# Patient Record
Sex: FEMALE | Race: BLACK OR AFRICAN AMERICAN | NOT HISPANIC OR LATINO | Employment: OTHER
[De-identification: names, ages, dates, MRNs, and addresses within clinical notes are randomized per-mention and may not be internally consistent; named-entity substitution may affect disease eponyms.]

---

## 2017-07-29 ENCOUNTER — HEALTH MAINTENANCE LETTER (OUTPATIENT)
Age: 32
End: 2017-07-29

## 2018-08-05 ENCOUNTER — HEALTH MAINTENANCE LETTER (OUTPATIENT)
Age: 33
End: 2018-08-05

## 2019-11-05 ENCOUNTER — HEALTH MAINTENANCE LETTER (OUTPATIENT)
Age: 34
End: 2019-11-05

## 2020-11-22 ENCOUNTER — HEALTH MAINTENANCE LETTER (OUTPATIENT)
Age: 35
End: 2020-11-22

## 2021-09-19 ENCOUNTER — HEALTH MAINTENANCE LETTER (OUTPATIENT)
Age: 36
End: 2021-09-19

## 2021-11-12 LAB
ABO (EXTERNAL): NORMAL
HEPATITIS B SURFACE ANTIGEN (EXTERNAL): NEGATIVE
HIV1+2 AB SERPL QL IA: NEGATIVE
RH (EXTERNAL): POSITIVE
RUBELLA ANTIBODY IGG (EXTERNAL): NORMAL
VDRL (SYPHILIS) (EXTERNAL): NEGATIVE

## 2022-01-09 ENCOUNTER — HEALTH MAINTENANCE LETTER (OUTPATIENT)
Age: 37
End: 2022-01-09

## 2022-03-19 ENCOUNTER — APPOINTMENT (OUTPATIENT)
Dept: CT IMAGING | Facility: CLINIC | Age: 37
End: 2022-03-19
Attending: EMERGENCY MEDICINE
Payer: COMMERCIAL

## 2022-03-19 ENCOUNTER — HOSPITAL ENCOUNTER (EMERGENCY)
Facility: CLINIC | Age: 37
Discharge: HOME | End: 2022-03-19
Attending: EMERGENCY MEDICINE | Admitting: EMERGENCY MEDICINE
Payer: COMMERCIAL

## 2022-03-19 ENCOUNTER — ANCILLARY PROCEDURE (OUTPATIENT)
Dept: ULTRASOUND IMAGING | Facility: CLINIC | Age: 37
End: 2022-03-19
Attending: EMERGENCY MEDICINE
Payer: COMMERCIAL

## 2022-03-19 VITALS
SYSTOLIC BLOOD PRESSURE: 111 MMHG | WEIGHT: 197 LBS | RESPIRATION RATE: 16 BRPM | OXYGEN SATURATION: 100 % | HEART RATE: 84 BPM | BODY MASS INDEX: 30.92 KG/M2 | DIASTOLIC BLOOD PRESSURE: 74 MMHG | TEMPERATURE: 98.1 F | HEIGHT: 67 IN

## 2022-03-19 DIAGNOSIS — D64.9 ANEMIA, UNSPECIFIED TYPE: ICD-10-CM

## 2022-03-19 DIAGNOSIS — R55 SYNCOPE, UNSPECIFIED SYNCOPE TYPE: ICD-10-CM

## 2022-03-19 DIAGNOSIS — E83.42 HYPOMAGNESEMIA: ICD-10-CM

## 2022-03-19 LAB
ANION GAP SERPL CALCULATED.3IONS-SCNC: 9 MMOL/L (ref 5–18)
BUN SERPL-MCNC: 8 MG/DL (ref 8–22)
CALCIUM SERPL-MCNC: 8.4 MG/DL (ref 8.5–10.5)
CHLORIDE BLD-SCNC: 107 MMOL/L (ref 98–107)
CO2 SERPL-SCNC: 21 MMOL/L (ref 22–31)
CREAT SERPL-MCNC: 0.64 MG/DL (ref 0.6–1.1)
D DIMER PPP FEU-MCNC: 0.69 UG/ML FEU (ref 0–0.5)
ERYTHROCYTE [DISTWIDTH] IN BLOOD BY AUTOMATED COUNT: 13.9 % (ref 10–15)
GFR SERPL CREATININE-BSD FRML MDRD: >90 ML/MIN/1.73M2
GLUCOSE BLD-MCNC: 95 MG/DL (ref 70–125)
HCT VFR BLD AUTO: 28.9 % (ref 35–47)
HGB BLD-MCNC: 9.4 G/DL (ref 11.7–15.7)
MAGNESIUM SERPL-MCNC: 1.7 MG/DL (ref 1.8–2.6)
MCH RBC QN AUTO: 27.6 PG (ref 26.5–33)
MCHC RBC AUTO-ENTMCNC: 32.5 G/DL (ref 31.5–36.5)
MCV RBC AUTO: 85 FL (ref 78–100)
PLATELET # BLD AUTO: 280 10E3/UL (ref 150–450)
POTASSIUM BLD-SCNC: 3.6 MMOL/L (ref 3.5–5)
RBC # BLD AUTO: 3.41 10E6/UL (ref 3.8–5.2)
SODIUM SERPL-SCNC: 137 MMOL/L (ref 136–145)
WBC # BLD AUTO: 8.4 10E3/UL (ref 4–11)

## 2022-03-19 PROCEDURE — 93005 ELECTROCARDIOGRAM TRACING: CPT | Performed by: EMERGENCY MEDICINE

## 2022-03-19 PROCEDURE — 80048 BASIC METABOLIC PNL TOTAL CA: CPT | Performed by: EMERGENCY MEDICINE

## 2022-03-19 PROCEDURE — 36415 COLL VENOUS BLD VENIPUNCTURE: CPT | Performed by: EMERGENCY MEDICINE

## 2022-03-19 PROCEDURE — 99285 EMERGENCY DEPT VISIT HI MDM: CPT | Mod: 25

## 2022-03-19 PROCEDURE — 85379 FIBRIN DEGRADATION QUANT: CPT | Performed by: EMERGENCY MEDICINE

## 2022-03-19 PROCEDURE — 250N000011 HC RX IP 250 OP 636: Performed by: EMERGENCY MEDICINE

## 2022-03-19 PROCEDURE — 250N000013 HC RX MED GY IP 250 OP 250 PS 637: Performed by: EMERGENCY MEDICINE

## 2022-03-19 PROCEDURE — 85027 COMPLETE CBC AUTOMATED: CPT | Performed by: EMERGENCY MEDICINE

## 2022-03-19 PROCEDURE — 76815 OB US LIMITED FETUS(S): CPT

## 2022-03-19 PROCEDURE — 83735 ASSAY OF MAGNESIUM: CPT | Performed by: EMERGENCY MEDICINE

## 2022-03-19 PROCEDURE — 71275 CT ANGIOGRAPHY CHEST: CPT

## 2022-03-19 RX ORDER — MAGNESIUM OXIDE 400 MG/1
400 TABLET ORAL ONCE
Status: COMPLETED | OUTPATIENT
Start: 2022-03-19 | End: 2022-03-19

## 2022-03-19 RX ORDER — IOPAMIDOL 755 MG/ML
75 INJECTION, SOLUTION INTRAVASCULAR ONCE
Status: COMPLETED | OUTPATIENT
Start: 2022-03-19 | End: 2022-03-19

## 2022-03-19 RX ADMIN — MAGNESIUM OXIDE TAB 400 MG (241.3 MG ELEMENTAL MG) 400 MG: 400 (241.3 MG) TAB at 22:47

## 2022-03-19 RX ADMIN — IOPAMIDOL 75 ML: 755 INJECTION, SOLUTION INTRAVENOUS at 22:12

## 2022-03-20 LAB
ATRIAL RATE - MUSE: 84 BPM
DIASTOLIC BLOOD PRESSURE - MUSE: 80 MMHG
INTERPRETATION ECG - MUSE: NORMAL
P AXIS - MUSE: 59 DEGREES
PR INTERVAL - MUSE: 144 MS
QRS DURATION - MUSE: 80 MS
QT - MUSE: 380 MS
QTC - MUSE: 449 MS
R AXIS - MUSE: 27 DEGREES
SYSTOLIC BLOOD PRESSURE - MUSE: 121 MMHG
T AXIS - MUSE: 37 DEGREES
VENTRICULAR RATE- MUSE: 84 BPM

## 2022-03-20 NOTE — ED TRIAGE NOTES
Arrives via [facility] from [facility]. Was ordering food at the counter when she began to feel hot and had tinnitus. Next thing she remembers is waking up on the ground. Now having lower abd pain and lower back pain, both 4/10. Pregnant, 27w4d. +FM. No vag bleeding.

## 2022-03-20 NOTE — ED PROVIDER NOTES
EMERGENCY DEPARTMENT ENCOUNTER      NAME: Erna Chambers  AGE: 37 year old female  YOB: 1985  MRN: [id number]  EVALUATION DATE & TIME: 3/19/2022  8:25 PM    PCP: No primary care provider on file.    ED PROVIDER: Mona Saenz MD    Chief Complaint   Patient presents with     Abdominal Pain         FINAL IMPRESSION:  1. Syncope, unspecified syncope type    2. Anemia, unspecified type    3. Hypomagnesemia          ED COURSE & MEDICAL DECISION MAKING:    Pertinent Labs & Imaging studies reviewed. (See chart for details)  37 year old female otherwise healthy G4, P3 at 27 weeks who presents to the Emergency Department for evaluation of syncopal episode after prodromal symptoms with lightheadedness dizziness feeling flushed, tenderness and mild shortness of breath.  Patient initially had some low back discomfort and abdominal pain after she awoke from her syncopal episode but by the time of my evaluation this is resolved.  She does not have any bleeding spotting leakage of fluid or sensation of contraction.  Bedside ultrasound performed with active fetus with fetal heart rates in the 130s.  OB RN came down to bedside, for tocometry.  No contractions and active fetus with good accelerations.  Given lack of bleeding, abdominal pain that is persistent and reassuring tracing my suspicion for abruption is low.  With regards to etiology I suspect that this is related to vasovagal syncope.  Differential is arrhythmia, dehydration, electrolyte abnormality, symptomatic anemia, PE.  Patient placed on monitor, IV established and blood obtained.  Twelve-lead EKG shows sinus rhythm.  CBC, BMP, magnesium, D-dimer notable for mild hypomagnesemia at 1.7 replaced orally, D-dimer elevated at 0.69 and hemoglobin of 9.4.  I do not have any previous value on file with which to compare nor does patient have a known history of anemia to her recollection.  Encouraged her to follow-up with her OB to compare to her baseline lab values  from her first trimester.  Given elevated D-dimer a CT PE study was performed and thankfully is negative.  Patient and spouse at bedside reassured.  Will be discharged home.      ED Course as of 22 0001   Sat Mar 19, 2022   2121 Hemoglobin(!): 9.4  No prev on file to compare    D-Dimer Quantitative(!): 0.69    Magnesium(!): 1.7     8:30 PM I met with the patient for the initial interview and physical examination. Discussed plan for treatment and workup in the ED.    8:40 PM I performed a POC ultrasound.   9:29 PM I rechecked and updated the patient.    10:53 PM I discussed the plan for discharge with the patient, and patient is agreeable. We discussed supportive cares at home and reasons for return to the ER including new or worsening symptoms - all questions and concerns addressed. Patient to be discharged by RN.     At the conclusion of the encounter I discussed the results of all of the tests and the disposition. The questions were answered. The patient or family acknowledged understanding and was agreeable with the care plan.    MEDICATIONS GIVEN IN THE EMERGENCY:  Medications   magnesium oxide (MAG-OX) tablet 400 mg (400 mg Oral Given 3/19/22 2247)   iopamidol (ISOVUE-370) solution 75 mL (75 mLs Intravenous Given 3/19/22 2212)       NEW PRESCRIPTIONS STARTED AT TODAY'S ER VISIT  There are no discharge medications for this patient.         =================================================================    HPI    Patient information was obtained from: patient     Use of Intrepreter: N/A       Enra Chambers is a 37 year old female with pertinent medical history of pregnancy (x3),  delivery (x3) who presents to the ED via EMS for evaluation of a syncopal episode.      Patient who is 27 weeks pregnant reports that she was ordering food at a counter when she began to feel hot, short of breath, and developed ringing in the ears, followed by syncopal episode. Patient reported 4/10 abdominal pain  "and back pain upon arrival to the ED, that is now improved and \"dull.\" She has been eating and drinking today as normal. Patient endorses sciatica pain with her pregnancy. Patient takes a prenatal vitamin, hydroxyzine, and an antidepressant. She has had 3 previous pregnancies, all delivered via . Denies any history of syncope. Patient denies any contractions, cramping, or similar sensation, and any other symptoms or complaints at this time.       REVIEW OF SYSTEMS  Constitutional:  Denies fever, chills, weight loss or weakness  HENT:  Denies sore throat, ear pain, congestion. Endorses tinnitus (resolved).   Respiratory: No wheeze or cough. Endorses shortness of breath (resolved).   Cardiovascular:  No CP, palpitations  GI:  Denies nausea, vomiting, diarrhea. Endorses abdominal pain (dull).   : Denies dysuria, denies hematuria, cramps.   Musculoskeletal:  Denies any new muscle pain, swelling or loss of function. Endorses back pain (dull).   Neurologic:  Denies headache, focal weakness or sensory changes. Endorses syncope.   All other systems negative unless noted in HPI.      PAST MEDICAL HISTORY:  History reviewed. No pertinent past medical history.    PAST SURGICAL HISTORY:  History reviewed. No pertinent surgical history.    CURRENT MEDICATIONS:    None       ALLERGIES:  No Known Allergies    FAMILY HISTORY:  History reviewed. No pertinent family history.    SOCIAL HISTORY:  Social History     Tobacco Use     Smoking status: None     Smokeless tobacco: None   Substance Use Topics     Alcohol use: None     Drug use: None        VITALS:  Patient Vitals for the past 24 hrs:   BP Temp Temp src Pulse Resp SpO2 Height Weight   22 2029 111/74 98.1  F (36.7  C) Oral 84 16 100 % 1.702 m (5' 7\") 89.4 kg (197 lb)       PHYSICAL EXAM    General Appearance: Well-appearing, well-nourished, no acute distress.   Head:  Normocephalic, atraumatic  Eyes:  PERRL, conjunctiva/corneas clear, EOM's intact  ENT:  Lips, " mucosa, and tongue normal; membranes are moist without pallor  Neck:  Supple  Cardio:  Regular rate and rhythm, no murmur/gallop/rub  Pulm:  No respiratory distress, clear to auscultation bilaterally  Back:  No midline tenderness to palpation, no paraspinal tenderness, No CVA tenderness, normal ROM  Abdomen:  Soft, non-tender, non distended,no rebound or guarding. Uterus gravid above the umbilicus.   Extremities: Moves all extremities normally, normal gait  Skin:  Skin warm, dry, no rashes  Neuro:  Alert and oriented ×3, moving all extremities, no gross sensory defects     RADIOLOGY/LABS:  Reviewed all pertinent imaging. Please see official radiology report. All pertinent labs reviewed and interpreted.    Results for orders placed or performed during the hospital encounter of 03/19/22   CT Chest Pulmonary Embolism w Contrast    Impression    IMPRESSION:  1.  No pulmonary embolus, aortic dissection, or aneurysm.   Basic metabolic panel   Result Value Ref Range    Sodium 137 136 - 145 mmol/L    Potassium 3.6 3.5 - 5.0 mmol/L    Chloride 107 98 - 107 mmol/L    Carbon Dioxide (CO2) 21 (L) 22 - 31 mmol/L    Anion Gap 9 5 - 18 mmol/L    Urea Nitrogen 8 8 - 22 mg/dL    Creatinine 0.64 0.60 - 1.10 mg/dL    Calcium 8.4 (L) 8.5 - 10.5 mg/dL    Glucose 95 70 - 125 mg/dL    GFR Estimate >90 >60 mL/min/1.73m2   CBC with platelets   Result Value Ref Range    WBC Count 8.4 4.0 - 11.0 10e3/uL    RBC Count 3.41 (L) 3.80 - 5.20 10e6/uL    Hemoglobin 9.4 (L) 11.7 - 15.7 g/dL    Hematocrit 28.9 (L) 35.0 - 47.0 %    MCV 85 78 - 100 fL    MCH 27.6 26.5 - 33.0 pg    MCHC 32.5 31.5 - 36.5 g/dL    RDW 13.9 10.0 - 15.0 %    Platelet Count 280 150 - 450 10e3/uL   Result Value Ref Range    Magnesium 1.7 (L) 1.8 - 2.6 mg/dL   D dimer quantitative   Result Value Ref Range    D-Dimer Quantitative 0.69 (H) 0.00 - 0.50 ug/mL FEU       EKG:  Performed at: 20:30:03    Impression: Sinus rhythm. Normal ECG.     Rate: 84 bpm   Rhythm: sinus   Axis: 27    NH Interval: 144 ms   QRS Interval: 80 ms   QTc Interval: 449 ms   ST Changes: none   Comparison: No previous available to compare.   I have independently reviewed and interpreted the EKG(s) documented above.      PROCEDURES:  PROCEDURE: Emergency Department Limited Bedside Screening Ultrasound   ANATOMICAL WINDOW:  Suprapubic abdomen   INDICATIONS:  Syncope in pregnancy   PROCEDURE PROVIDER:   Mona Saenz   FINDINGS:  Active intrauterine pregnancy measuring appropriate for dates with fetal heart rates in the 130s   IMAGES SAVED AND STORED FOR ARCHIVE AND REVIEW: Yes        The creation of this record is based on the scribe s observations of the work being performed by Mona Saenz MD and the provider s statements to them. It was created on his behalf by Valerie Diaz, a trained medical scribe. This document has been checked and approved by the attending provider.    Mona Saenz MD  Emergency Medicine  [facility] EMERGENCY ROOM  [address] [id number] [phone]  Dept: [phone]      Mona Saenz MD  03/20/22 0001

## 2022-03-20 NOTE — PROGRESS NOTES
NST performed with baseline FHR of 145, multiple 15x15 accelerations noted, audible fetal movement. NST is reactive and cat I without any UCs. Pt states good fetal movement, denies any leaking and/or bleeding.

## 2022-05-02 DIAGNOSIS — D50.9 IRON DEFICIENCY ANEMIA, UNSPECIFIED: ICD-10-CM

## 2022-05-02 DIAGNOSIS — D50.0 IRON DEFICIENCY ANEMIA SECONDARY TO BLOOD LOSS (CHRONIC): Primary | ICD-10-CM

## 2022-05-02 RX ORDER — ALBUTEROL SULFATE 0.83 MG/ML
2.5 SOLUTION RESPIRATORY (INHALATION)
Status: CANCELLED | OUTPATIENT
Start: 2022-05-05

## 2022-05-02 RX ORDER — MEPERIDINE HYDROCHLORIDE 25 MG/ML
25 INJECTION INTRAMUSCULAR; INTRAVENOUS; SUBCUTANEOUS EVERY 30 MIN PRN
Status: CANCELLED | OUTPATIENT
Start: 2022-05-05

## 2022-05-02 RX ORDER — HEPARIN SODIUM,PORCINE 10 UNIT/ML
5 VIAL (ML) INTRAVENOUS
Status: CANCELLED | OUTPATIENT
Start: 2022-05-05

## 2022-05-02 RX ORDER — HEPARIN SODIUM (PORCINE) LOCK FLUSH IV SOLN 100 UNIT/ML 100 UNIT/ML
5 SOLUTION INTRAVENOUS
Status: CANCELLED | OUTPATIENT
Start: 2022-05-05

## 2022-05-02 RX ORDER — METHYLPREDNISOLONE SODIUM SUCCINATE 125 MG/2ML
125 INJECTION, POWDER, LYOPHILIZED, FOR SOLUTION INTRAMUSCULAR; INTRAVENOUS
Status: CANCELLED
Start: 2022-05-05

## 2022-05-02 RX ORDER — NALOXONE HYDROCHLORIDE 0.4 MG/ML
0.2 INJECTION, SOLUTION INTRAMUSCULAR; INTRAVENOUS; SUBCUTANEOUS
Status: CANCELLED | OUTPATIENT
Start: 2022-05-05

## 2022-05-02 RX ORDER — ALBUTEROL SULFATE 90 UG/1
1-2 AEROSOL, METERED RESPIRATORY (INHALATION)
Status: CANCELLED
Start: 2022-05-05

## 2022-05-02 RX ORDER — EPINEPHRINE 1 MG/ML
0.3 INJECTION, SOLUTION, CONCENTRATE INTRAVENOUS EVERY 5 MIN PRN
Status: CANCELLED | OUTPATIENT
Start: 2022-05-05

## 2022-05-02 RX ORDER — DIPHENHYDRAMINE HYDROCHLORIDE 50 MG/ML
50 INJECTION INTRAMUSCULAR; INTRAVENOUS
Status: CANCELLED
Start: 2022-05-05

## 2022-05-05 ENCOUNTER — INFUSION THERAPY VISIT (OUTPATIENT)
Dept: INFUSION THERAPY | Facility: CLINIC | Age: 37
End: 2022-05-05
Attending: OBSTETRICS & GYNECOLOGY
Payer: COMMERCIAL

## 2022-05-05 VITALS
HEART RATE: 88 BPM | RESPIRATION RATE: 20 BRPM | SYSTOLIC BLOOD PRESSURE: 102 MMHG | TEMPERATURE: 97.9 F | OXYGEN SATURATION: 98 % | DIASTOLIC BLOOD PRESSURE: 68 MMHG

## 2022-05-05 DIAGNOSIS — D50.9 IRON DEFICIENCY ANEMIA, UNSPECIFIED: ICD-10-CM

## 2022-05-05 DIAGNOSIS — D50.0 IRON DEFICIENCY ANEMIA SECONDARY TO BLOOD LOSS (CHRONIC): Primary | ICD-10-CM

## 2022-05-05 PROCEDURE — 96365 THER/PROPH/DIAG IV INF INIT: CPT

## 2022-05-05 PROCEDURE — 258N000003 HC RX IP 258 OP 636: Performed by: OBSTETRICS & GYNECOLOGY

## 2022-05-05 PROCEDURE — 250N000011 HC RX IP 250 OP 636: Performed by: OBSTETRICS & GYNECOLOGY

## 2022-05-05 RX ORDER — EPINEPHRINE 1 MG/ML
0.3 INJECTION, SOLUTION, CONCENTRATE INTRAVENOUS EVERY 5 MIN PRN
Status: CANCELLED | OUTPATIENT
Start: 2022-05-07

## 2022-05-05 RX ORDER — EPINEPHRINE 1 MG/ML
0.3 INJECTION, SOLUTION, CONCENTRATE INTRAVENOUS EVERY 5 MIN PRN
Status: DISCONTINUED | OUTPATIENT
Start: 2022-05-05 | End: 2022-05-05 | Stop reason: HOSPADM

## 2022-05-05 RX ORDER — ALBUTEROL SULFATE 0.83 MG/ML
2.5 SOLUTION RESPIRATORY (INHALATION)
Status: DISCONTINUED | OUTPATIENT
Start: 2022-05-05 | End: 2022-05-05 | Stop reason: HOSPADM

## 2022-05-05 RX ORDER — HEPARIN SODIUM,PORCINE 10 UNIT/ML
5 VIAL (ML) INTRAVENOUS
Status: CANCELLED | OUTPATIENT
Start: 2022-05-07

## 2022-05-05 RX ORDER — METHYLPREDNISOLONE SODIUM SUCCINATE 125 MG/2ML
125 INJECTION, POWDER, LYOPHILIZED, FOR SOLUTION INTRAMUSCULAR; INTRAVENOUS
Status: CANCELLED
Start: 2022-05-07

## 2022-05-05 RX ORDER — ALBUTEROL SULFATE 90 UG/1
1-2 AEROSOL, METERED RESPIRATORY (INHALATION)
Status: CANCELLED
Start: 2022-05-07

## 2022-05-05 RX ORDER — NALOXONE HYDROCHLORIDE 0.4 MG/ML
0.2 INJECTION, SOLUTION INTRAMUSCULAR; INTRAVENOUS; SUBCUTANEOUS
Status: CANCELLED | OUTPATIENT
Start: 2022-05-07

## 2022-05-05 RX ORDER — DIPHENHYDRAMINE HYDROCHLORIDE 50 MG/ML
50 INJECTION INTRAMUSCULAR; INTRAVENOUS
Status: CANCELLED
Start: 2022-05-07

## 2022-05-05 RX ORDER — NALOXONE HYDROCHLORIDE 0.4 MG/ML
0.2 INJECTION, SOLUTION INTRAMUSCULAR; INTRAVENOUS; SUBCUTANEOUS
Status: DISCONTINUED | OUTPATIENT
Start: 2022-05-05 | End: 2022-05-05 | Stop reason: HOSPADM

## 2022-05-05 RX ORDER — MEPERIDINE HYDROCHLORIDE 50 MG/ML
25 INJECTION INTRAMUSCULAR; INTRAVENOUS; SUBCUTANEOUS EVERY 30 MIN PRN
Status: DISCONTINUED | OUTPATIENT
Start: 2022-05-05 | End: 2022-05-05 | Stop reason: HOSPADM

## 2022-05-05 RX ORDER — HEPARIN SODIUM (PORCINE) LOCK FLUSH IV SOLN 100 UNIT/ML 100 UNIT/ML
5 SOLUTION INTRAVENOUS
Status: CANCELLED | OUTPATIENT
Start: 2022-05-07

## 2022-05-05 RX ORDER — ALBUTEROL SULFATE 0.83 MG/ML
2.5 SOLUTION RESPIRATORY (INHALATION)
Status: CANCELLED | OUTPATIENT
Start: 2022-05-07

## 2022-05-05 RX ORDER — ALBUTEROL SULFATE 90 UG/1
1-2 AEROSOL, METERED RESPIRATORY (INHALATION)
Status: DISCONTINUED | OUTPATIENT
Start: 2022-05-05 | End: 2022-05-05 | Stop reason: HOSPADM

## 2022-05-05 RX ORDER — MEPERIDINE HYDROCHLORIDE 50 MG/ML
25 INJECTION INTRAMUSCULAR; INTRAVENOUS; SUBCUTANEOUS EVERY 30 MIN PRN
Status: CANCELLED | OUTPATIENT
Start: 2022-05-07

## 2022-05-05 RX ORDER — DIPHENHYDRAMINE HYDROCHLORIDE 50 MG/ML
50 INJECTION INTRAMUSCULAR; INTRAVENOUS
Status: DISCONTINUED | OUTPATIENT
Start: 2022-05-05 | End: 2022-05-05 | Stop reason: HOSPADM

## 2022-05-05 RX ORDER — METHYLPREDNISOLONE SODIUM SUCCINATE 125 MG/2ML
125 INJECTION, POWDER, LYOPHILIZED, FOR SOLUTION INTRAMUSCULAR; INTRAVENOUS
Status: DISCONTINUED | OUTPATIENT
Start: 2022-05-05 | End: 2022-05-05 | Stop reason: HOSPADM

## 2022-05-05 RX ADMIN — SODIUM CHLORIDE 250 ML: 9 INJECTION, SOLUTION INTRAVENOUS at 08:16

## 2022-05-05 RX ADMIN — FERUMOXYTOL 510 MG: 510 INJECTION INTRAVENOUS at 08:20

## 2022-05-05 NOTE — PROGRESS NOTES
Infusion Nursing Note:  Erna Colunga presents today for feraheme.    Patient seen by provider today: No   present during visit today: Not Applicable.    Note: Erna arrived ambulatory by herself for her first dose of feraheme. She did not receive IV iron with her prior pregnancies. She is feeling well with the exception of fatigue from low iron. Plan of care reviewed and printed UpToDate patient education on feraheme was reviewed and provided. All questions answered at this time.  Erna was instructed to call the ordering provider for additional questions or concerns following discharge today.    Intravenous Access:  Peripheral IV placed in left AC without difficulty.    Treatment Conditions:  Not Applicable.    Post Infusion Assessment:  Patient tolerated infusion without incident.  Patient observed for 30 minutes post feraheme per protocol. VSS, no signs of allergic reaction noted.  Blood return noted pre and post infusion.  Site patent and intact, free from redness, edema or discomfort.  Access discontinued per protocol.     Discharge Plan:   AVS declined. Patient will return May 12th for next appointment.   Patient discharged in stable condition accompanied by: self.  Departure Mode: Ambulatory.      Nayely Welch RN

## 2022-05-12 ENCOUNTER — INFUSION THERAPY VISIT (OUTPATIENT)
Dept: INFUSION THERAPY | Facility: CLINIC | Age: 37
End: 2022-05-12
Attending: OBSTETRICS & GYNECOLOGY
Payer: COMMERCIAL

## 2022-05-12 VITALS
SYSTOLIC BLOOD PRESSURE: 104 MMHG | TEMPERATURE: 98 F | RESPIRATION RATE: 18 BRPM | HEART RATE: 88 BPM | DIASTOLIC BLOOD PRESSURE: 68 MMHG | OXYGEN SATURATION: 98 %

## 2022-05-12 DIAGNOSIS — D50.0 IRON DEFICIENCY ANEMIA SECONDARY TO BLOOD LOSS (CHRONIC): Primary | ICD-10-CM

## 2022-05-12 DIAGNOSIS — D50.9 IRON DEFICIENCY ANEMIA, UNSPECIFIED: ICD-10-CM

## 2022-05-12 PROCEDURE — 258N000003 HC RX IP 258 OP 636: Performed by: OBSTETRICS & GYNECOLOGY

## 2022-05-12 PROCEDURE — 96365 THER/PROPH/DIAG IV INF INIT: CPT

## 2022-05-12 PROCEDURE — 250N000011 HC RX IP 250 OP 636: Performed by: OBSTETRICS & GYNECOLOGY

## 2022-05-12 RX ORDER — ALBUTEROL SULFATE 90 UG/1
1-2 AEROSOL, METERED RESPIRATORY (INHALATION)
Status: DISCONTINUED | OUTPATIENT
Start: 2022-05-12 | End: 2022-05-12 | Stop reason: HOSPADM

## 2022-05-12 RX ORDER — NALOXONE HYDROCHLORIDE 0.4 MG/ML
0.2 INJECTION, SOLUTION INTRAMUSCULAR; INTRAVENOUS; SUBCUTANEOUS
Status: CANCELLED | OUTPATIENT
Start: 2022-05-12

## 2022-05-12 RX ORDER — MEPERIDINE HYDROCHLORIDE 50 MG/ML
25 INJECTION INTRAMUSCULAR; INTRAVENOUS; SUBCUTANEOUS EVERY 30 MIN PRN
Status: CANCELLED | OUTPATIENT
Start: 2022-05-12

## 2022-05-12 RX ORDER — ALBUTEROL SULFATE 90 UG/1
1-2 AEROSOL, METERED RESPIRATORY (INHALATION)
Status: CANCELLED
Start: 2022-05-12

## 2022-05-12 RX ORDER — HEPARIN SODIUM (PORCINE) LOCK FLUSH IV SOLN 100 UNIT/ML 100 UNIT/ML
5 SOLUTION INTRAVENOUS
Status: CANCELLED | OUTPATIENT
Start: 2022-05-12

## 2022-05-12 RX ORDER — EPINEPHRINE 1 MG/ML
0.3 INJECTION, SOLUTION, CONCENTRATE INTRAVENOUS EVERY 5 MIN PRN
Status: DISCONTINUED | OUTPATIENT
Start: 2022-05-12 | End: 2022-05-12 | Stop reason: HOSPADM

## 2022-05-12 RX ORDER — METHYLPREDNISOLONE SODIUM SUCCINATE 125 MG/2ML
125 INJECTION, POWDER, LYOPHILIZED, FOR SOLUTION INTRAMUSCULAR; INTRAVENOUS
Status: CANCELLED
Start: 2022-05-12

## 2022-05-12 RX ORDER — METHYLPREDNISOLONE SODIUM SUCCINATE 125 MG/2ML
125 INJECTION, POWDER, LYOPHILIZED, FOR SOLUTION INTRAMUSCULAR; INTRAVENOUS
Status: DISCONTINUED | OUTPATIENT
Start: 2022-05-12 | End: 2022-05-12 | Stop reason: HOSPADM

## 2022-05-12 RX ORDER — ALBUTEROL SULFATE 0.83 MG/ML
2.5 SOLUTION RESPIRATORY (INHALATION)
Status: CANCELLED | OUTPATIENT
Start: 2022-05-12

## 2022-05-12 RX ORDER — DIPHENHYDRAMINE HYDROCHLORIDE 50 MG/ML
50 INJECTION INTRAMUSCULAR; INTRAVENOUS
Status: CANCELLED
Start: 2022-05-12

## 2022-05-12 RX ORDER — ALBUTEROL SULFATE 0.83 MG/ML
2.5 SOLUTION RESPIRATORY (INHALATION)
Status: DISCONTINUED | OUTPATIENT
Start: 2022-05-12 | End: 2022-05-12 | Stop reason: HOSPADM

## 2022-05-12 RX ORDER — HEPARIN SODIUM,PORCINE 10 UNIT/ML
5 VIAL (ML) INTRAVENOUS
Status: CANCELLED | OUTPATIENT
Start: 2022-05-12

## 2022-05-12 RX ORDER — EPINEPHRINE 1 MG/ML
0.3 INJECTION, SOLUTION, CONCENTRATE INTRAVENOUS EVERY 5 MIN PRN
Status: CANCELLED | OUTPATIENT
Start: 2022-05-12

## 2022-05-12 RX ORDER — MEPERIDINE HYDROCHLORIDE 50 MG/ML
25 INJECTION INTRAMUSCULAR; INTRAVENOUS; SUBCUTANEOUS EVERY 30 MIN PRN
Status: DISCONTINUED | OUTPATIENT
Start: 2022-05-12 | End: 2022-05-12 | Stop reason: HOSPADM

## 2022-05-12 RX ORDER — NALOXONE HYDROCHLORIDE 0.4 MG/ML
0.2 INJECTION, SOLUTION INTRAMUSCULAR; INTRAVENOUS; SUBCUTANEOUS
Status: DISCONTINUED | OUTPATIENT
Start: 2022-05-12 | End: 2022-05-12 | Stop reason: HOSPADM

## 2022-05-12 RX ORDER — DIPHENHYDRAMINE HYDROCHLORIDE 50 MG/ML
50 INJECTION INTRAMUSCULAR; INTRAVENOUS
Status: DISCONTINUED | OUTPATIENT
Start: 2022-05-12 | End: 2022-05-12 | Stop reason: HOSPADM

## 2022-05-12 RX ADMIN — FERUMOXYTOL 510 MG: 510 INJECTION INTRAVENOUS at 13:15

## 2022-05-12 RX ADMIN — SODIUM CHLORIDE 250 ML: 9 INJECTION, SOLUTION INTRAVENOUS at 13:14

## 2022-05-12 NOTE — PROGRESS NOTES
Infusion Nursing Note:  Erna Colunga presents today for feraheme.    Patient seen by provider today: No   present during visit today: Not Applicable.    Note: Erna is here for her second dose of feraheme. She tolerated her first infusion well without issue. Plan of care reviewed and she has no additional questions.    Intravenous Access:  Peripheral IV placed in left AC.    Treatment Conditions:  Not Applicable.    Post Infusion Assessment:  Patient tolerated infusion without incident.  Patient observed for 30 minutes post feraheme per protocol. VSS.  Blood return noted pre and post infusion.  Site patent and intact, free from redness, edema or discomfort.  Access discontinued per protocol.     Discharge Plan:   Patient discharged in stable condition accompanied by: self.  Departure Mode: Ambulatory.      Nayely Welch RN

## 2022-05-22 ENCOUNTER — HOSPITAL ENCOUNTER (OUTPATIENT)
Facility: CLINIC | Age: 37
Discharge: HOME | End: 2022-05-22
Attending: OBSTETRICS & GYNECOLOGY | Admitting: OBSTETRICS & GYNECOLOGY
Payer: COMMERCIAL

## 2022-05-22 VITALS
OXYGEN SATURATION: 98 % | HEART RATE: 93 BPM | RESPIRATION RATE: 18 BRPM | SYSTOLIC BLOOD PRESSURE: 125 MMHG | DIASTOLIC BLOOD PRESSURE: 82 MMHG | TEMPERATURE: 98.2 F

## 2022-05-22 PROBLEM — Z36.89 ENCOUNTER FOR TRIAGE IN PREGNANT PATIENT: Status: ACTIVE | Noted: 2022-05-22

## 2022-05-22 LAB
CLUE CELLS: ABNORMAL
GROUP B STREPTOCOCCUS (EXTERNAL): NEGATIVE
RUPTURE OF FETAL MEMBRANES BY ROM PLUS: NEGATIVE
TRICHOMONAS, WET PREP: ABNORMAL
WBC'S/HIGH POWER FIELD, WET PREP: ABNORMAL
YEAST, WET PREP: ABNORMAL

## 2022-05-22 PROCEDURE — 87210 SMEAR WET MOUNT SALINE/INK: CPT | Performed by: OBSTETRICS & GYNECOLOGY

## 2022-05-22 PROCEDURE — 87653 STREP B DNA AMP PROBE: CPT | Performed by: OBSTETRICS & GYNECOLOGY

## 2022-05-22 PROCEDURE — 84112 EVAL AMNIOTIC FLUID PROTEIN: CPT | Performed by: OBSTETRICS & GYNECOLOGY

## 2022-05-22 PROCEDURE — 250N000013 HC RX MED GY IP 250 OP 250 PS 637: Performed by: OBSTETRICS & GYNECOLOGY

## 2022-05-22 PROCEDURE — G0463 HOSPITAL OUTPT CLINIC VISIT: HCPCS

## 2022-05-22 RX ORDER — HYDROXYZINE HYDROCHLORIDE 25 MG/1
100 TABLET, FILM COATED ORAL EVERY 6 HOURS PRN
Status: DISCONTINUED | OUTPATIENT
Start: 2022-05-22 | End: 2022-05-23 | Stop reason: HOSPADM

## 2022-05-22 RX ORDER — HYDROXYZINE HYDROCHLORIDE 25 MG/1
50 TABLET, FILM COATED ORAL EVERY 6 HOURS PRN
Status: DISCONTINUED | OUTPATIENT
Start: 2022-05-22 | End: 2022-05-23 | Stop reason: HOSPADM

## 2022-05-22 RX ORDER — SERTRALINE HYDROCHLORIDE 100 MG/1
100 TABLET, FILM COATED ORAL DAILY
COMMUNITY

## 2022-05-22 RX ORDER — LIDOCAINE 40 MG/G
CREAM TOPICAL
Status: DISCONTINUED | OUTPATIENT
Start: 2022-05-22 | End: 2022-05-23 | Stop reason: HOSPADM

## 2022-05-22 RX ADMIN — HYDROXYZINE HYDROCHLORIDE 100 MG: 25 TABLET, FILM COATED ORAL at 21:52

## 2022-05-23 LAB — GP B STREP DNA SPEC QL NAA+PROBE: NEGATIVE

## 2022-05-23 NOTE — DISCHARGE INSTRUCTIONS
Discharge Instruction for Undelivered Patients      You were seen for: Membrane Assessment  We Consulted: Dr Deisi Vasquez  You had (Test or Medicine):Fetal monitoring, ROM (rupture of membranes) test, wet prep test, GBS swab test, and sterile vaginal exam.   Test/swabs resulted negative, sterile exam was 1cm/60/-3/firm/posterior    Diet:   Drink 8 to 12 glasses of liquids (milk, juice, water) every day.  You may eat meals and snacks.     Activity:  Count fetal kicks everyday (see handout)  Call your doctor or nurse midwife if your baby is moving less than usual.     Call your provider if you notice:  Swelling in your face or increased swelling in your hands or legs.  Headaches that are not relieved by Tylenol (acetaminophen).  Changes in your vision (blurring: seeing spots or stars.)  Nausea (sick to your stomach) and vomiting (throwing up).   Weight gain of 5 pounds or more per week.  Heartburn that doesn't go away.  Signs of bladder infection: pain when you urinate (use the toilet), need to go more often and more urgently.  The bag of mooney (rupture of membranes) breaks, or you notice leaking in your underwear.  Bright red blood in your underwear.  Abdominal (lower belly) or stomach pain.  For first baby: Contractions (tightening) less than 5 minutes apart for one hour or more.  Second (plus) baby: Contractions (tightening) less than 10 minutes apart and getting stronger.  *If less than 34 weeks: Contractions (tightening) more than 6 times in one hour.  Increase or change in vaginal discharge (note the color and amount)  Other: Keep your routine scheduled appointment.  Call your provider if you have any further concerns or questions.     You may take Tylenol as prescribed for discomfort.  You were given 100mg of Hydroxazine (or Vistaril) at the hospital today for rest.     Follow-up:  As scheduled in the clinic      Data: Patient assessed in the [facility] for leaking vaginal fluid.  Cervical exam posterior and  dilated to 1.  Membranes intact.  Contractions irregular and mild every 6-8min.  Action:  Presumed adequate fetal oxygenation documented (see flow record). Discharge instructions reviewed.  Patient instructed to report change in fetal movement, vaginal leaking of fluid or bleeding, abdominal pain, or any concerns related to the pregnancy to her nurse/physician.    Response: Orders to discharge home per Rolanda Vasquez.  Patient verbalized understanding of education and verbalized agreement with plan. Discharged to home at 2200

## 2022-05-23 NOTE — PROGRESS NOTES
RN called Dr Vasquez to update her to pt and fetal status.  ROM came back negative. Wet prep test Negative.  MD encouraged hydration at home and vistaril and tylenol for discomfort and rest.  Will plan to discharge to home.

## 2022-05-28 DIAGNOSIS — Z11.59 ENCOUNTER FOR SCREENING FOR OTHER VIRAL DISEASES: Primary | ICD-10-CM

## 2022-06-03 ENCOUNTER — LAB (OUTPATIENT)
Dept: LAB | Facility: CLINIC | Age: 37
End: 2022-06-03
Attending: OBSTETRICS & GYNECOLOGY
Payer: COMMERCIAL

## 2022-06-03 DIAGNOSIS — Z11.59 ENCOUNTER FOR SCREENING FOR OTHER VIRAL DISEASES: ICD-10-CM

## 2022-06-03 PROCEDURE — U0005 INFEC AGEN DETEC AMPLI PROBE: HCPCS

## 2022-06-03 PROCEDURE — U0003 INFECTIOUS AGENT DETECTION BY NUCLEIC ACID (DNA OR RNA); SEVERE ACUTE RESPIRATORY SYNDROME CORONAVIRUS 2 (SARS-COV-2) (CORONAVIRUS DISEASE [COVID-19]), AMPLIFIED PROBE TECHNIQUE, MAKING USE OF HIGH THROUGHPUT TECHNOLOGIES AS DESCRIBED BY CMS-2020-01-R: HCPCS

## 2022-06-04 LAB — SARS-COV-2 RNA RESP QL NAA+PROBE: NEGATIVE

## 2022-06-07 ENCOUNTER — ANESTHESIA (OUTPATIENT)
Dept: OBGYN | Facility: CLINIC | Age: 37
End: 2022-06-07
Payer: COMMERCIAL

## 2022-06-07 ENCOUNTER — HOSPITAL ENCOUNTER (INPATIENT)
Facility: CLINIC | Age: 37
LOS: 2 days | Discharge: HOME HEALTH | End: 2022-06-09
Attending: OBSTETRICS & GYNECOLOGY | Admitting: OBSTETRICS & GYNECOLOGY
Payer: COMMERCIAL

## 2022-06-07 ENCOUNTER — ANESTHESIA EVENT (OUTPATIENT)
Dept: OBGYN | Facility: CLINIC | Age: 37
End: 2022-06-07
Payer: COMMERCIAL

## 2022-06-07 LAB
ABO/RH(D): NORMAL
ANTIBODY SCREEN: NEGATIVE
HGB BLD-MCNC: 11.1 G/DL (ref 11.7–15.7)
SPECIMEN EXPIRATION DATE: NORMAL
T PALLIDUM AB SER QL: NONREACTIVE

## 2022-06-07 PROCEDURE — 88302 TISSUE EXAM BY PATHOLOGIST: CPT | Mod: 26 | Performed by: PATHOLOGY

## 2022-06-07 PROCEDURE — 88302 TISSUE EXAM BY PATHOLOGIST: CPT | Mod: TC | Performed by: OBSTETRICS & GYNECOLOGY

## 2022-06-07 PROCEDURE — 250N000011 HC RX IP 250 OP 636: Performed by: OBSTETRICS & GYNECOLOGY

## 2022-06-07 PROCEDURE — 250N000009 HC RX 250: Performed by: NURSE ANESTHETIST, CERTIFIED REGISTERED

## 2022-06-07 PROCEDURE — 250N000011 HC RX IP 250 OP 636: Performed by: NURSE ANESTHETIST, CERTIFIED REGISTERED

## 2022-06-07 PROCEDURE — 250N000009 HC RX 250: Performed by: ANESTHESIOLOGY

## 2022-06-07 PROCEDURE — 258N000003 HC RX IP 258 OP 636: Performed by: ANESTHESIOLOGY

## 2022-06-07 PROCEDURE — 999N000016 HC STATISTIC ATTENDANCE AT DELIVERY

## 2022-06-07 PROCEDURE — 250N000009 HC RX 250: Performed by: SPEECH-LANGUAGE PATHOLOGIST

## 2022-06-07 PROCEDURE — 250N000011 HC RX IP 250 OP 636: Performed by: SPEECH-LANGUAGE PATHOLOGIST

## 2022-06-07 PROCEDURE — 999N000157 HC STATISTIC RCP TIME EA 10 MIN

## 2022-06-07 PROCEDURE — 250N000011 HC RX IP 250 OP 636: Performed by: ANESTHESIOLOGY

## 2022-06-07 PROCEDURE — 360N000076 HC SURGERY LEVEL 3, PER MIN: Performed by: OBSTETRICS & GYNECOLOGY

## 2022-06-07 PROCEDURE — 86850 RBC ANTIBODY SCREEN: CPT | Performed by: SPEECH-LANGUAGE PATHOLOGIST

## 2022-06-07 PROCEDURE — 85018 HEMOGLOBIN: CPT | Performed by: SPEECH-LANGUAGE PATHOLOGIST

## 2022-06-07 PROCEDURE — 120N000001 HC R&B MED SURG/OB

## 2022-06-07 PROCEDURE — 272N000001 HC OR GENERAL SUPPLY STERILE: Performed by: OBSTETRICS & GYNECOLOGY

## 2022-06-07 PROCEDURE — 250N000013 HC RX MED GY IP 250 OP 250 PS 637: Performed by: OBSTETRICS & GYNECOLOGY

## 2022-06-07 PROCEDURE — 86780 TREPONEMA PALLIDUM: CPT | Performed by: SPEECH-LANGUAGE PATHOLOGIST

## 2022-06-07 PROCEDURE — 370N000017 HC ANESTHESIA TECHNICAL FEE, PER MIN: Performed by: OBSTETRICS & GYNECOLOGY

## 2022-06-07 PROCEDURE — 999N000249 HC STATISTIC C-SECTION ON UNIT

## 2022-06-07 PROCEDURE — 999N000249 HC STATISTIC C-SECTION ON UNIT: Performed by: OBSTETRICS & GYNECOLOGY

## 2022-06-07 PROCEDURE — 36415 COLL VENOUS BLD VENIPUNCTURE: CPT | Performed by: SPEECH-LANGUAGE PATHOLOGIST

## 2022-06-07 PROCEDURE — 258N000003 HC RX IP 258 OP 636: Performed by: NURSE ANESTHETIST, CERTIFIED REGISTERED

## 2022-06-07 PROCEDURE — 86901 BLOOD TYPING SEROLOGIC RH(D): CPT | Performed by: SPEECH-LANGUAGE PATHOLOGIST

## 2022-06-07 PROCEDURE — 250N000013 HC RX MED GY IP 250 OP 250 PS 637: Performed by: SPEECH-LANGUAGE PATHOLOGIST

## 2022-06-07 PROCEDURE — 0UT70ZZ RESECTION OF BILATERAL FALLOPIAN TUBES, OPEN APPROACH: ICD-10-PCS | Performed by: OBSTETRICS & GYNECOLOGY

## 2022-06-07 RX ORDER — CITRIC ACID/SODIUM CITRATE 334-500MG
30 SOLUTION, ORAL ORAL
Status: COMPLETED | OUTPATIENT
Start: 2022-06-07 | End: 2022-06-07

## 2022-06-07 RX ORDER — OXYTOCIN/0.9 % SODIUM CHLORIDE 30/500 ML
340 PLASTIC BAG, INJECTION (ML) INTRAVENOUS CONTINUOUS PRN
Status: COMPLETED | OUTPATIENT
Start: 2022-06-07 | End: 2022-06-07

## 2022-06-07 RX ORDER — MISOPROSTOL 200 UG/1
800 TABLET ORAL
Status: DISCONTINUED | OUTPATIENT
Start: 2022-06-07 | End: 2022-06-09 | Stop reason: HOSPADM

## 2022-06-07 RX ORDER — LIDOCAINE 40 MG/G
CREAM TOPICAL
Status: DISCONTINUED | OUTPATIENT
Start: 2022-06-07 | End: 2022-06-09 | Stop reason: HOSPADM

## 2022-06-07 RX ORDER — METOCLOPRAMIDE HYDROCHLORIDE 5 MG/ML
10 INJECTION INTRAMUSCULAR; INTRAVENOUS EVERY 6 HOURS PRN
Status: DISCONTINUED | OUTPATIENT
Start: 2022-06-07 | End: 2022-06-09 | Stop reason: HOSPADM

## 2022-06-07 RX ORDER — METHYLERGONOVINE MALEATE 0.2 MG/ML
200 INJECTION INTRAVENOUS
Status: DISCONTINUED | OUTPATIENT
Start: 2022-06-07 | End: 2022-06-09 | Stop reason: HOSPADM

## 2022-06-07 RX ORDER — SODIUM CHLORIDE, SODIUM LACTATE, POTASSIUM CHLORIDE, CALCIUM CHLORIDE 600; 310; 30; 20 MG/100ML; MG/100ML; MG/100ML; MG/100ML
INJECTION, SOLUTION INTRAVENOUS CONTINUOUS
Status: DISCONTINUED | OUTPATIENT
Start: 2022-06-07 | End: 2022-06-07 | Stop reason: HOSPADM

## 2022-06-07 RX ORDER — NALOXONE HYDROCHLORIDE 0.4 MG/ML
0.4 INJECTION, SOLUTION INTRAMUSCULAR; INTRAVENOUS; SUBCUTANEOUS
Status: DISCONTINUED | OUTPATIENT
Start: 2022-06-07 | End: 2022-06-09 | Stop reason: HOSPADM

## 2022-06-07 RX ORDER — BUPIVACAINE HYDROCHLORIDE AND EPINEPHRINE 2.5; 5 MG/ML; UG/ML
INJECTION, SOLUTION INFILTRATION; PERINEURAL
Status: COMPLETED | OUTPATIENT
Start: 2022-06-07 | End: 2022-06-07

## 2022-06-07 RX ORDER — FENTANYL CITRATE 50 UG/ML
50 INJECTION, SOLUTION INTRAMUSCULAR; INTRAVENOUS EVERY 5 MIN PRN
Status: CANCELLED | OUTPATIENT
Start: 2022-06-07

## 2022-06-07 RX ORDER — ONDANSETRON 2 MG/ML
INJECTION INTRAMUSCULAR; INTRAVENOUS PRN
Status: DISCONTINUED | OUTPATIENT
Start: 2022-06-07 | End: 2022-06-07

## 2022-06-07 RX ORDER — KETOROLAC TROMETHAMINE 30 MG/ML
INJECTION, SOLUTION INTRAMUSCULAR; INTRAVENOUS PRN
Status: DISCONTINUED | OUTPATIENT
Start: 2022-06-07 | End: 2022-06-07

## 2022-06-07 RX ORDER — MODIFIED LANOLIN
OINTMENT (GRAM) TOPICAL
Status: DISCONTINUED | OUTPATIENT
Start: 2022-06-07 | End: 2022-06-09 | Stop reason: HOSPADM

## 2022-06-07 RX ORDER — PROCHLORPERAZINE 25 MG
25 SUPPOSITORY, RECTAL RECTAL EVERY 12 HOURS PRN
Status: DISCONTINUED | OUTPATIENT
Start: 2022-06-07 | End: 2022-06-09 | Stop reason: HOSPADM

## 2022-06-07 RX ORDER — NALOXONE HYDROCHLORIDE 0.4 MG/ML
0.2 INJECTION, SOLUTION INTRAMUSCULAR; INTRAVENOUS; SUBCUTANEOUS
Status: DISCONTINUED | OUTPATIENT
Start: 2022-06-07 | End: 2022-06-09 | Stop reason: HOSPADM

## 2022-06-07 RX ORDER — ONDANSETRON 2 MG/ML
4 INJECTION INTRAMUSCULAR; INTRAVENOUS EVERY 30 MIN PRN
Status: CANCELLED | OUTPATIENT
Start: 2022-06-07

## 2022-06-07 RX ORDER — DEXAMETHASONE SODIUM PHOSPHATE 4 MG/ML
INJECTION, SOLUTION INTRA-ARTICULAR; INTRALESIONAL; INTRAMUSCULAR; INTRAVENOUS; SOFT TISSUE PRN
Status: DISCONTINUED | OUTPATIENT
Start: 2022-06-07 | End: 2022-06-07

## 2022-06-07 RX ORDER — PROCHLORPERAZINE MALEATE 10 MG
10 TABLET ORAL EVERY 6 HOURS PRN
Status: DISCONTINUED | OUTPATIENT
Start: 2022-06-07 | End: 2022-06-09 | Stop reason: HOSPADM

## 2022-06-07 RX ORDER — ACETAMINOPHEN 325 MG/1
975 TABLET ORAL EVERY 6 HOURS
Status: DISCONTINUED | OUTPATIENT
Start: 2022-06-07 | End: 2022-06-09 | Stop reason: HOSPADM

## 2022-06-07 RX ORDER — ACETAMINOPHEN 325 MG/1
975 TABLET ORAL ONCE
Status: COMPLETED | OUTPATIENT
Start: 2022-06-07 | End: 2022-06-07

## 2022-06-07 RX ORDER — KETOROLAC TROMETHAMINE 30 MG/ML
30 INJECTION, SOLUTION INTRAMUSCULAR; INTRAVENOUS EVERY 6 HOURS
Status: COMPLETED | OUTPATIENT
Start: 2022-06-07 | End: 2022-06-08

## 2022-06-07 RX ORDER — HYDROCORTISONE 25 MG/G
CREAM TOPICAL 3 TIMES DAILY PRN
Status: DISCONTINUED | OUTPATIENT
Start: 2022-06-07 | End: 2022-06-09 | Stop reason: HOSPADM

## 2022-06-07 RX ORDER — HYDROMORPHONE HCL IN WATER/PF 6 MG/30 ML
0.2 PATIENT CONTROLLED ANALGESIA SYRINGE INTRAVENOUS EVERY 5 MIN PRN
Status: CANCELLED | OUTPATIENT
Start: 2022-06-07

## 2022-06-07 RX ORDER — AMOXICILLIN 250 MG
2 CAPSULE ORAL 2 TIMES DAILY
Status: DISCONTINUED | OUTPATIENT
Start: 2022-06-07 | End: 2022-06-09 | Stop reason: HOSPADM

## 2022-06-07 RX ORDER — CARBOPROST TROMETHAMINE 250 UG/ML
250 INJECTION, SOLUTION INTRAMUSCULAR
Status: DISCONTINUED | OUTPATIENT
Start: 2022-06-07 | End: 2022-06-09 | Stop reason: HOSPADM

## 2022-06-07 RX ORDER — CARBOPROST TROMETHAMINE 250 UG/ML
250 INJECTION, SOLUTION INTRAMUSCULAR
Status: DISCONTINUED | OUTPATIENT
Start: 2022-06-07 | End: 2022-06-07 | Stop reason: HOSPADM

## 2022-06-07 RX ORDER — SIMETHICONE 80 MG
80 TABLET,CHEWABLE ORAL 4 TIMES DAILY PRN
Status: DISCONTINUED | OUTPATIENT
Start: 2022-06-07 | End: 2022-06-09 | Stop reason: HOSPADM

## 2022-06-07 RX ORDER — MISOPROSTOL 200 UG/1
800 TABLET ORAL
Status: DISCONTINUED | OUTPATIENT
Start: 2022-06-07 | End: 2022-06-07 | Stop reason: HOSPADM

## 2022-06-07 RX ORDER — BUPIVACAINE HYDROCHLORIDE 7.5 MG/ML
INJECTION, SOLUTION INTRASPINAL
Status: COMPLETED | OUTPATIENT
Start: 2022-06-07 | End: 2022-06-07

## 2022-06-07 RX ORDER — HYDROXYZINE HYDROCHLORIDE 25 MG/1
25 TABLET, FILM COATED ORAL EVERY 6 HOURS PRN
Status: ON HOLD | COMMUNITY
End: 2022-06-09

## 2022-06-07 RX ORDER — AMOXICILLIN 250 MG
1 CAPSULE ORAL 2 TIMES DAILY
Status: DISCONTINUED | OUTPATIENT
Start: 2022-06-07 | End: 2022-06-09 | Stop reason: HOSPADM

## 2022-06-07 RX ORDER — LIDOCAINE 40 MG/G
CREAM TOPICAL
Status: DISCONTINUED | OUTPATIENT
Start: 2022-06-07 | End: 2022-06-07 | Stop reason: HOSPADM

## 2022-06-07 RX ORDER — OXYTOCIN/0.9 % SODIUM CHLORIDE 30/500 ML
100-340 PLASTIC BAG, INJECTION (ML) INTRAVENOUS CONTINUOUS PRN
Status: DISCONTINUED | OUTPATIENT
Start: 2022-06-07 | End: 2022-06-09 | Stop reason: HOSPADM

## 2022-06-07 RX ORDER — METOCLOPRAMIDE 10 MG/1
10 TABLET ORAL EVERY 6 HOURS PRN
Status: DISCONTINUED | OUTPATIENT
Start: 2022-06-07 | End: 2022-06-09 | Stop reason: HOSPADM

## 2022-06-07 RX ORDER — OXYCODONE HYDROCHLORIDE 5 MG/1
5 TABLET ORAL EVERY 4 HOURS PRN
Status: DISCONTINUED | OUTPATIENT
Start: 2022-06-07 | End: 2022-06-09 | Stop reason: HOSPADM

## 2022-06-07 RX ORDER — IBUPROFEN 800 MG/1
800 TABLET, FILM COATED ORAL EVERY 6 HOURS
Status: DISCONTINUED | OUTPATIENT
Start: 2022-06-08 | End: 2022-06-09 | Stop reason: HOSPADM

## 2022-06-07 RX ORDER — NALBUPHINE HYDROCHLORIDE 10 MG/ML
2.5-5 INJECTION, SOLUTION INTRAMUSCULAR; INTRAVENOUS; SUBCUTANEOUS EVERY 6 HOURS PRN
Status: DISCONTINUED | OUTPATIENT
Start: 2022-06-07 | End: 2022-06-09 | Stop reason: HOSPADM

## 2022-06-07 RX ORDER — METHYLERGONOVINE MALEATE 0.2 MG/ML
200 INJECTION INTRAVENOUS
Status: DISCONTINUED | OUTPATIENT
Start: 2022-06-07 | End: 2022-06-07 | Stop reason: HOSPADM

## 2022-06-07 RX ORDER — MISOPROSTOL 200 UG/1
400 TABLET ORAL
Status: DISCONTINUED | OUTPATIENT
Start: 2022-06-07 | End: 2022-06-07 | Stop reason: HOSPADM

## 2022-06-07 RX ORDER — OXYTOCIN 10 [USP'U]/ML
10 INJECTION, SOLUTION INTRAMUSCULAR; INTRAVENOUS
Status: DISCONTINUED | OUTPATIENT
Start: 2022-06-07 | End: 2022-06-09 | Stop reason: HOSPADM

## 2022-06-07 RX ORDER — ONDANSETRON 4 MG/1
4 TABLET, ORALLY DISINTEGRATING ORAL EVERY 30 MIN PRN
Status: CANCELLED | OUTPATIENT
Start: 2022-06-07

## 2022-06-07 RX ORDER — OXYTOCIN 10 [USP'U]/ML
10 INJECTION, SOLUTION INTRAMUSCULAR; INTRAVENOUS
Status: DISCONTINUED | OUTPATIENT
Start: 2022-06-07 | End: 2022-06-07 | Stop reason: HOSPADM

## 2022-06-07 RX ORDER — MORPHINE SULFATE 1 MG/ML
INJECTION, SOLUTION EPIDURAL; INTRATHECAL; INTRAVENOUS
Status: COMPLETED | OUTPATIENT
Start: 2022-06-07 | End: 2022-06-07

## 2022-06-07 RX ORDER — MISOPROSTOL 200 UG/1
400 TABLET ORAL
Status: DISCONTINUED | OUTPATIENT
Start: 2022-06-07 | End: 2022-06-09 | Stop reason: HOSPADM

## 2022-06-07 RX ORDER — FENTANYL CITRATE 50 UG/ML
INJECTION, SOLUTION INTRAMUSCULAR; INTRAVENOUS
Status: COMPLETED | OUTPATIENT
Start: 2022-06-07 | End: 2022-06-07

## 2022-06-07 RX ORDER — OXYTOCIN/0.9 % SODIUM CHLORIDE 30/500 ML
340 PLASTIC BAG, INJECTION (ML) INTRAVENOUS CONTINUOUS PRN
Status: DISCONTINUED | OUTPATIENT
Start: 2022-06-07 | End: 2022-06-09 | Stop reason: HOSPADM

## 2022-06-07 RX ORDER — CEFAZOLIN SODIUM 2 G/100ML
2 INJECTION, SOLUTION INTRAVENOUS SEE ADMIN INSTRUCTIONS
Status: DISCONTINUED | OUTPATIENT
Start: 2022-06-07 | End: 2022-06-07 | Stop reason: HOSPADM

## 2022-06-07 RX ORDER — SODIUM CHLORIDE, SODIUM LACTATE, POTASSIUM CHLORIDE, CALCIUM CHLORIDE 600; 310; 30; 20 MG/100ML; MG/100ML; MG/100ML; MG/100ML
INJECTION, SOLUTION INTRAVENOUS CONTINUOUS
Status: CANCELLED | OUTPATIENT
Start: 2022-06-07

## 2022-06-07 RX ORDER — MORPHINE SULFATE 0.5 MG/ML
150 INJECTION, SOLUTION EPIDURAL; INTRATHECAL; INTRAVENOUS ONCE
Status: DISCONTINUED | OUTPATIENT
Start: 2022-06-07 | End: 2022-06-07 | Stop reason: HOSPADM

## 2022-06-07 RX ORDER — EPHEDRINE SULFATE 50 MG/ML
INJECTION, SOLUTION INTRAMUSCULAR; INTRAVENOUS; SUBCUTANEOUS PRN
Status: DISCONTINUED | OUTPATIENT
Start: 2022-06-07 | End: 2022-06-07

## 2022-06-07 RX ORDER — ONDANSETRON 4 MG/1
4 TABLET, ORALLY DISINTEGRATING ORAL EVERY 6 HOURS PRN
Status: DISCONTINUED | OUTPATIENT
Start: 2022-06-07 | End: 2022-06-09 | Stop reason: HOSPADM

## 2022-06-07 RX ORDER — FENTANYL CITRATE-0.9 % NACL/PF 10 MCG/ML
100 PLASTIC BAG, INJECTION (ML) INTRAVENOUS EVERY 5 MIN PRN
Status: DISCONTINUED | OUTPATIENT
Start: 2022-06-07 | End: 2022-06-07 | Stop reason: HOSPADM

## 2022-06-07 RX ORDER — CEFAZOLIN SODIUM 2 G/100ML
2 INJECTION, SOLUTION INTRAVENOUS
Status: COMPLETED | OUTPATIENT
Start: 2022-06-07 | End: 2022-06-07

## 2022-06-07 RX ORDER — DEXTROSE, SODIUM CHLORIDE, SODIUM LACTATE, POTASSIUM CHLORIDE, AND CALCIUM CHLORIDE 5; .6; .31; .03; .02 G/100ML; G/100ML; G/100ML; G/100ML; G/100ML
INJECTION, SOLUTION INTRAVENOUS CONTINUOUS
Status: DISCONTINUED | OUTPATIENT
Start: 2022-06-07 | End: 2022-06-09 | Stop reason: HOSPADM

## 2022-06-07 RX ORDER — ONDANSETRON 2 MG/ML
4 INJECTION INTRAMUSCULAR; INTRAVENOUS EVERY 6 HOURS PRN
Status: DISCONTINUED | OUTPATIENT
Start: 2022-06-07 | End: 2022-06-09 | Stop reason: HOSPADM

## 2022-06-07 RX ORDER — BISACODYL 10 MG
10 SUPPOSITORY, RECTAL RECTAL DAILY PRN
Status: DISCONTINUED | OUTPATIENT
Start: 2022-06-09 | End: 2022-06-09 | Stop reason: HOSPADM

## 2022-06-07 RX ADMIN — FENTANYL CITRATE 15 MCG: 50 INJECTION, SOLUTION INTRAMUSCULAR; INTRAVENOUS at 13:01

## 2022-06-07 RX ADMIN — DEXAMETHASONE SODIUM PHOSPHATE 10 MG: 4 INJECTION, SOLUTION INTRA-ARTICULAR; INTRALESIONAL; INTRAMUSCULAR; INTRAVENOUS; SOFT TISSUE at 13:10

## 2022-06-07 RX ADMIN — ACETAMINOPHEN 975 MG: 325 TABLET ORAL at 20:23

## 2022-06-07 RX ADMIN — SODIUM CITRATE AND CITRIC ACID MONOHYDRATE 30 ML: 500; 334 SOLUTION ORAL at 12:30

## 2022-06-07 RX ADMIN — PHENYLEPHRINE HYDROCHLORIDE 0.8 MCG/KG/MIN: 10 INJECTION INTRAVENOUS at 13:02

## 2022-06-07 RX ADMIN — CEFAZOLIN SODIUM 2 G: 2 INJECTION, SOLUTION INTRAVENOUS at 12:54

## 2022-06-07 RX ADMIN — SODIUM CHLORIDE, POTASSIUM CHLORIDE, SODIUM LACTATE AND CALCIUM CHLORIDE: 600; 310; 30; 20 INJECTION, SOLUTION INTRAVENOUS at 12:51

## 2022-06-07 RX ADMIN — SODIUM CHLORIDE, POTASSIUM CHLORIDE, SODIUM LACTATE AND CALCIUM CHLORIDE 250 ML: 600; 310; 30; 20 INJECTION, SOLUTION INTRAVENOUS at 12:04

## 2022-06-07 RX ADMIN — BUPIVACAINE HYDROCHLORIDE AND EPINEPHRINE BITARTRATE 40 ML: 2.5; .005 INJECTION, SOLUTION INFILTRATION; PERINEURAL at 14:14

## 2022-06-07 RX ADMIN — SENNOSIDES AND DOCUSATE SODIUM 1 TABLET: 50; 8.6 TABLET ORAL at 20:23

## 2022-06-07 RX ADMIN — PHENYLEPHRINE HYDROCHLORIDE 100 MCG: 10 INJECTION INTRAVENOUS at 13:25

## 2022-06-07 RX ADMIN — ONDANSETRON 4 MG: 2 INJECTION INTRAMUSCULAR; INTRAVENOUS at 13:10

## 2022-06-07 RX ADMIN — Medication 10 MG: at 13:07

## 2022-06-07 RX ADMIN — PHENYLEPHRINE HYDROCHLORIDE 100 MCG: 10 INJECTION INTRAVENOUS at 13:07

## 2022-06-07 RX ADMIN — KETOROLAC TROMETHAMINE 30 MG: 30 INJECTION, SOLUTION INTRAMUSCULAR; INTRAVENOUS at 22:43

## 2022-06-07 RX ADMIN — SODIUM CHLORIDE, POTASSIUM CHLORIDE, SODIUM LACTATE AND CALCIUM CHLORIDE: 600; 310; 30; 20 INJECTION, SOLUTION INTRAVENOUS at 13:25

## 2022-06-07 RX ADMIN — Medication 600 ML/HR: at 13:30

## 2022-06-07 RX ADMIN — KETOROLAC TROMETHAMINE 30 MG: 30 INJECTION, SOLUTION INTRAMUSCULAR at 14:01

## 2022-06-07 RX ADMIN — BUPIVACAINE HYDROCHLORIDE IN DEXTROSE 1.6 ML: 7.5 INJECTION, SOLUTION SUBARACHNOID at 13:01

## 2022-06-07 RX ADMIN — Medication 0.15 MG: at 13:01

## 2022-06-07 RX ADMIN — ACETAMINOPHEN 975 MG: 325 TABLET ORAL at 12:30

## 2022-06-07 ASSESSMENT — ACTIVITIES OF DAILY LIVING (ADL)
DIFFICULTY_EATING/SWALLOWING: NO
TOILETING_ISSUES: NO
DOING_ERRANDS_INDEPENDENTLY_DIFFICULTY: NO
ADLS_ACUITY_SCORE: 18
CHANGE_IN_FUNCTIONAL_STATUS_SINCE_ONSET_OF_CURRENT_ILLNESS/INJURY: NO
ADLS_ACUITY_SCORE: 18
ADLS_ACUITY_SCORE: 18
DRESSING/BATHING_DIFFICULTY: NO
ADLS_ACUITY_SCORE: 18
WALKING_OR_CLIMBING_STAIRS_DIFFICULTY: NO
ADLS_ACUITY_SCORE: 18
WEAR_GLASSES_OR_BLIND: NO
CONCENTRATING,_REMEMBERING_OR_MAKING_DECISIONS_DIFFICULTY: NO
FALL_HISTORY_WITHIN_LAST_SIX_MONTHS: NO
ADLS_ACUITY_SCORE: 18
ADLS_ACUITY_SCORE: 35

## 2022-06-07 NOTE — PHARMACY-ADMISSION MEDICATION HISTORY
Pharmacy Note - Admission Medication History    Pertinent Provider Information:    ______________________________________________________________________    Prior To Admission (PTA) med list completed and updated in EMR.       PTA Med List   Medication Sig Last Dose     hydrOXYzine (ATARAX) 25 MG tablet Take 25 mg by mouth every 6 hours as needed for itching prn     sertraline (ZOLOFT) 100 MG tablet Take 100 mg by mouth daily 6/7/2022       Information source(s): Patient    Method of interview communication: phone    Patient was asked about OTC/herbal products specifically.  PTA med list reflects this.    Based on the pharmacist's assessment, the PTA med list information appears reliable    Allergies were reviewed, assessed, and updated with the patient.      Patient does not use any multi-dose medications prior to admission.     Thank you for the opportunity to participate in the care of this patient.      Aide Barrett RPH     6/7/2022     4:16 PM

## 2022-06-07 NOTE — OP NOTE
[facility] Operative Note    Patient Name: Erna Colunga   Patient :  1985  Patient MRN:  [id number]    Procedure date: 2022    Pre-operative diagnosis: Previous  section [Z98.891]      Post-operative diagnosis: Same  Desires permanent sterilization  AMA  H/o c/s x 3       Procedure: Procedure(s):  REPEAT  SECTION AND TUBAL LIGATION VIA BILATERAL SALPINGECTOMY      Surgeon: Frances Borges MD     Assistants(s): Tatyana Zhang MD     Anesthesia: Spinal with Tap Block      Estimated blood loss: 1500 ml      Specimens: ID Type Source Tests Collected by Time Destination   A :  Tissue Fallopian Tube, Right SEE PROVIDERS ORDERS Frances Borges MD 2022  1:49 PM    B :  Tissue Fallopian Tube, Left SEE PROVIDERS ORDERS Frances Borges MD 2022  1:49 PM          Findings: Male infant, vertex, apgars 8/9, weight 7lbs 14 oz. Normal uterus, tubes and ovaries. Thick fascial adhesions.     Complications: None.     Condition: Stable       Description of procedure:  Risks, benefits and alternatives were reviewed, including the risks of bleeding, infection and injury to pelvic structures including bladder, bowel and ureters.  After both verbal and written consent were obtained, the patient was taken to [facility] L&D Operating Room where anesthesia was administered without difficulty.  Aguillon catheter was placed and preoperative antibiotics were administered.  The patient was prepped and draped in the usual sterile fashion in the dorsal supine position with a leftward tilt.    After testing for appropriate anesthesia, a Pfannenstiel skin incision was made with scalpel at her previous incision site and carried down to the underlying layer of fascia with both sharp and Bovie cautery.  The fascia was nicked in the midline and this incision was extended laterally with Stearns scissors.  The fascia was dissected off the underlying rectus muscles with both  sharp and blunt dissection.  The rectus muscle was  sharply and bluntly in the midline and the peritoneum was entered and  sharply and bluntly as well.  Bladder blade was inserted and the vesicouterine peritoneum was identified, grasped with pickup, and entered sharply with Metzenbaum scissors.  This incision was extended laterally and a bladder flap created digitally.    The bladder blade was reinserted and the lower uterine segment was incised in a transverse fashion with scalpel and the incision was extended bluntly. Bandage scissors were used to further extend the incision. Amniotomy was performed with thin meconium noted.  The  head was brought to the incision site and was delivered atraumatically.  The nose and mouth were bulb suctioned.  The remainder of the  was delivered, cord clamped x 2 and cut.   was handed off to waiting OR staff.    The placenta was delivered manually and the uterus was exteriorized and cleared of all clot and debris.  The lower uterine segment was repaired in a running locked fashion with 0-Monocryl.  A second layer was performed in an imbricating manner using 0-Monocryl.      Attention was turned to the right fallopian tube. The tube was grasped with a Sally clamp. While elevating the tube, the Ligasure device was used to cauerize and cut the mesosalpinx and excise the tube entirely. The tube was passed off. The same procedure was performed on the left tube.     The uterus was returned to the abdomen.  The hysterotomy site was examined and found to be hemostatic. However there was some slight surrounding oozing and Surgicel powder was applied. The peritoneum was closed with 2-0 Vicryl in a running fashion.    The rectus muscles were bluntly reapproximated in the midline.  The fascia was closed with 0-loop PDS in a running fashion.  The subcutaneous tissues were examined and any bleeding was gently coagulated with Bovie cautery.  The  subcuteaneous tissue was reapproximated using 3-0 Plain.  The skin was closed with 4-0 Monocryl in a running fashion.  Steri-strips and bandages were applied.     The patient tolerated the procedure well.  All sponge, lap, and needle counts were correct x 2 by OR staff.  The patient was transferred to her Recovery Room in stable condition.      Frances Borges MD

## 2022-06-07 NOTE — PLAN OF CARE
"  Problem: Plan of Care - These are the overarching goals to be used throughout the patient stay.    Goal: Patient-Specific Goal (Individualized)  Description: You can add care plan individualizations to a care plan. Examples of Individualization might be:  \"Parent requests to be called daily at 9am for status\", \"I have a hard time hearing out of my right ear\", or \"Do not touch me to wake me up as it startles me\".  Outcome: Ongoing, Progressing     Problem: Plan of Care - These are the overarching goals to be used throughout the patient stay.    Goal: Optimal Comfort and Wellbeing  Outcome: Ongoing, Progressing  Intervention: Provide Person-Centered Care  Recent Flowsheet Documentation  Taken 6/7/2022 1520 by Evi Cavanaugh, RN  Trust Relationship/Rapport:   care explained   choices provided   emotional support provided   empathic listening provided   questions encouraged   questions answered   reassurance provided   thoughts/feelings acknowledged         Patients vitals are stable. Patients Aguillon is out. Patient has been eating and drinking. Patient comfortable. Will continue to monitor.       Evi Cavanaugh RN      "

## 2022-06-07 NOTE — ANESTHESIA CARE TRANSFER NOTE
Patient: Erna Colunga    Procedure: Procedure(s):  REPEAT  SECTION AND TUBAL LIGATION VIA BILATERAL SALPINGECTOMY       Diagnosis: Previous  section [Z98.891]  Diagnosis Additional Information: No value filed.    Anesthesia Type:   Spinal     Note:    Oropharynx: oropharynx clear of all foreign objects and spontaneously breathing  Level of Consciousness: awake  Oxygen Supplementation: room air    Independent Airway: airway patency satisfactory and stable  Dentition: dentition unchanged  Vital Signs Stable: post-procedure vital signs reviewed and stable  Report to RN Given: handoff report given  Patient transferred to: Labor and Delivery    Handoff Report: Identifed the Patient, Identified the Reponsible Provider, Reviewed the pertinent medical history, Discussed the surgical course, Reviewed Intra-OP anesthesia mangement and issues during anesthesia, Set expectations for post-procedure period and Allowed opportunity for questions and acknowledgement of understanding      Vitals:  Vitals Value Taken Time   /51 22 1433   Temp 36.5  C (97.7  F) 22 1433   Pulse 88 22 1433   Resp 16 22 1433   SpO2 100 % 22 1433       Electronically Signed By: TREVON Garcia CRNA  2022  2:36 PM

## 2022-06-07 NOTE — H&P
[facility] Labor and Delivery History and Physical    Erna Colunga MRN# [id number]   Age: 37 year old YOB: 1985     Date of Admission:  2022    Primary care provider: System, Provider Not In           Chief Complaint:   Erna Colunga is a 37 year old female who is 39w0d pregnant and being admitted for schedule repeat csection and bilateral tubal sterilization. Patient desires permanent sterilization. Active baby. No concerns.           Pregnancy history:     OBSTETRIC HISTORY:    OB History    Para Term  AB Living   4 1 1 0 1 1   SAB IAB Ectopic Multiple Live Births   0 1 0 0 1      # Outcome Date GA Lbr Curt/2nd Weight Sex Delivery Anes PTL Lv   4 Current            3 Term 07 41w0d  3.799 kg (8 lb 6 oz) M CS   HELENE      Birth Comments: FTD and fetal stress      Name: Meek Alonzo IAB 10/01/06     IAB   DEC   1                 EDC: Estimated Date of Delivery: 22    Prenatal complications: AMA, anemia, h/o c/s x 3    Prenatal Labs:   Lab Results   Component Value Date    ABO B 2013    RH  Pos 2013    AS Neg 2013    HEPBANG Negative 2013    CHPCRT  12/10/2012     Negative for C. trachomatis rRNA by transcription mediated amplification.   A negative result by transcription mediated amplification does not preclude the   presence of C. trachomatis infection because results are dependent on proper   and adequate collection, absence of inhibitors, and sufficient rRNA to be   detected.    GCPCRT  12/10/2012     Negative for N. gonorrhoeae rRNA by transcription mediated amplification.   A negative result by transcription mediated amplification does not preclude the   presence of N. gonorrhoeae infection because results are dependent on proper   and adequate collection, absence of inhibitors, and sufficient rRNA to be   detected.    TREPAB Negative 2013    RUBELLAABIGG 49 2013    HGB 11.1 (L) 2022    HIV Negative  12/10/2012       GBS Status:   Lab Results   Component Value Date    GBS Negative 2022       Active Problem List  Patient Active Problem List   Diagnosis     Labia enlarged     Over weight     HSV-2 (herpes simplex virus 2) infection     CARDIOVASCULAR SCREENING; LDL GOAL LESS THAN 130     Vitamin D deficiency     Iron deficiency anemia secondary to blood loss (chronic)     Iron deficiency anemia, unspecified     Encounter for triage in pregnant patient       Medication Prior to Admission  Medications Prior to Admission   Medication Sig Dispense Refill Last Dose     Cholecalciferol (VITAMIN D) 2000 UNITS tablet Take 2 tablets by mouth daily 180 tablet 3      Doxylamine Succinate, Sleep, (UNISOM PO) Take 1 tablet by mouth daily        ondansetron (ZOFRAN) 8 MG tablet Take 1 tablet (8 mg) by mouth every 8 hours as needed for nausea 30 tablet 1      sertraline (ZOLOFT) 100 MG tablet Take 100 mg by mouth daily      .        Maternal Past Medical History:     Past Medical History:   Diagnosis Date     Childhood asthma      Herpes 2010    type II     NO ACTIVE PROBLEMS                           Review of Systems:   The Review of Systems is negative other than noted in the HPI          Physical Exam:   Temp: 98.4  F (36.9  C) Temp src: Oral BP: 110/71              Gen: NAD  CV: RRR  Chest: CTAB                       Assessment:   Erna Colunga is a 39w0d pregnant female admitted with plan for repeat csection and tubal sterilization via bilateral salpingectomy.           Plan:   Admit - see IP orders  Proceed with  and bilateral salpingectomy.     Frances Borges MD

## 2022-06-07 NOTE — ANESTHESIA PREPROCEDURE EVALUATION
Anesthesia Pre-Procedure Evaluation    Patient: Erna Colunga   MRN: [id number] : 1985        Procedure : Procedure(s):  REPEAT  SECTION AND TUBAL LIGATION VIA BILATERAL SALPINGECTOMY          Past Medical History:   Diagnosis Date     Childhood asthma      Herpes 2010    type II     NO ACTIVE PROBLEMS       Past Surgical History:   Procedure Laterality Date     HC TOOTH EXTRACTION W/FORCEP       HC US GUIDE FOR CHORIONIC VILLUS SAMPLING  2014     Z  DELIVERY ONLY  2007      Allergies   Allergen Reactions     No Known Allergies       Social History     Tobacco Use     Smoking status: Never Smoker     Smokeless tobacco: Never Used   Substance Use Topics     Alcohol use: No      Wt Readings from Last 1 Encounters:   22 89.4 kg (197 lb)        Anesthesia Evaluation   Pt has had prior anesthetic. Type: Regional.    History of anesthetic complications   PDPH requiring epidural blood patch after first SAB. Did not recurr with second SAB.    ROS/MED HX  ENT/Pulmonary:     (+) asthma (childhood - resolved)     Neurologic:  - neg neurologic ROS     Cardiovascular:  - neg cardiovascular ROS   (+) hypertension-----    METS/Exercise Tolerance:     Hematologic:     (+) anemia (hgb 10.2 after IV iron),     Musculoskeletal:       GI/Hepatic: Comment: Hx of gastric sleeve     (-) GERD   Renal/Genitourinary:  - neg Renal ROS     Endo:  - neg endo ROS   (+) Obesity,     Psychiatric/Substance Use:  - neg psychiatric ROS     Infectious Disease: Comment: Recent Results (from the past 120 hour(s))  -Asymptomatic COVID-19 Virus (Coronavirus) by PCR Nose:   Collection Time: 22 10:35 AM  Specimen: Nose; Swab       Result                                            Value                         Ref Range                       SARS CoV2 PCR                                     Negative                      Negative, Testing sent t*        Malignancy:       Other:      (+) Possibly pregnant ( s/f  repeat LTCS (third)), , previous  (First under GETA for emergent fetal distress, second and third under SAB),  (-) TOLAC candidate       Physical Exam    Airway        Mallampati: III   TM distance: > 3 FB      Respiratory Devices and Support         Dental           Cardiovascular             Pulmonary                   OUTSIDE LABS:  CBC:   Lab Results   Component Value Date    WBC 8.4 2022    WBC 8.0 2013    HGB 9.4 (L) 2022    HGB 12.4 2013    HCT 28.9 (L) 2022    HCT 36.6 2013     2022     2013     BMP:   Lab Results   Component Value Date     2022     10/18/2013    POTASSIUM 3.6 2022    POTASSIUM 4.1 10/18/2013    CHLORIDE 107 2022    CHLORIDE 106 10/18/2013    CO2 21 (L) 2022    CO2 28 10/18/2013    BUN 8 2022    BUN 10 10/18/2013    CR 0.64 2022    CR 0.74 10/18/2013    GLC 95 2022    GLC 98 10/18/2013     COAGS: No results found for: PTT, INR, FIBR  POC:   Lab Results   Component Value Date    HCG Positive (A) 2012     HEPATIC:   Lab Results   Component Value Date    ALBUMIN 3.7 (L) 10/18/2013    PROTTOTAL 7.6 10/18/2013    ALT 37 10/18/2013    AST 28 10/18/2013    ALKPHOS 62 10/18/2013    BILITOTAL 0.2 10/18/2013     OTHER:   Lab Results   Component Value Date    ASHKAN 8.4 (L) 2022    MAG 1.7 (L) 2022    TSH 0.51 10/18/2013    CRP <5.0 12/10/2012    SED 19 12/10/2012       Anesthesia Plan    ASA Status:  2      Anesthesia Type: Spinal.              Consents            Postoperative Care            Comments:    Other Comments: Elective CS ERAS Protocol  SAB - Duramorph 150mcg, Fentanyl 15mcg  zofran 4mg pre sab  Lower body Mayur Hugger  Toradol 15mg at case closure if cleared with surgeon  TAP per protocol/OB request  2 PIV given C/S #4              Kavya Martines MD

## 2022-06-07 NOTE — ANESTHESIA PROCEDURE NOTES
Intrathecal injection Procedure Note    Pre-Procedure   Staff -        Anesthesiologist:  Kavya Peña MD       Performed By: anesthesiologist       Location: OR       Procedure Start/Stop Times: 6/7/2022 1:01 PM and 6/7/2022 1:04 PM       Pre-Anesthestic Checklist: patient identified, IV checked, risks and benefits discussed, informed consent, monitors and equipment checked, pre-op evaluation, at physician/surgeon's request and post-op pain management  Timeout:       Correct Patient: Yes        Correct Procedure: Yes        Correct Site: Yes        Correct Position: Yes   Procedure Documentation  Procedure: intrathecal injection       Patient Position: sitting       Skin prep: Chloraprep       Insertion Site: L3-4. (midline approach).       Needle Gauge: 25.        Needle Length (Inches): 3.5        Spinal Needle Type: Pencan       Introducer used       Introducer: 20 G       # of attempts: 1 and  # of redirects:  1    Assessment/Narrative         Paresthesias: No.       Sensory Level: T4       CSF fluid: clear.       Opening pressure was cmH2O while  Sitting.      Medication(s) Administered   0.75% Hyperbaric Bupivacaine (Intrathecal) - Intrathecal   1.6 mL - 6/7/2022 1:01:00 PM  Fentanyl PF (Intrathecal) - Intrathecal   15 mcg - 6/7/2022 1:01:00 PM  Morphine PF 1 mg/mL (Intrathecal) - Intrathecal   0.15 mg - 6/7/2022 1:01:00 PM  Medication Administration Time: 6/7/2022 1:01 PM      
TAP Procedure Note    Pre-Procedure   Staff -        Anesthesiologist:  Kavya Peña MD       Performed By: anesthesiologist       Location: OR       Procedure Start/Stop Times: 6/7/2022 2:14 PM and 6/7/2022 2:19 PM       Pre-Anesthestic Checklist: patient identified, IV checked, site marked, risks and benefits discussed, informed consent, monitors and equipment checked, pre-op evaluation, at physician/surgeon's request and post-op pain management  Timeout:       Correct Patient: Yes        Correct Procedure: Yes        Correct Site: Yes        Correct Position: Yes        Correct Laterality: Yes        Site Marked: Yes  Procedure Documentation  Procedure: TAP       Laterality: bilateral       Patient Position: supine       Skin prep: Chloraprep       Needle type: Stimuplex.       Needle Gauge: 21.        Needle Length (Inches): 6        Ultrasound guided       1. Ultrasound was used to identify targeted nerve, plexus, vascular marker, or fascial plane and place a needle adjacent to it in real-time.       2. Ultrasound was used to visualize the spread of anesthetic in close proximity to the above referenced structure.       3. A permanent image is entered into the patient's record.       4. The visualized anatomic structures appeared normal.       5. There were no apparent abnormal pathologic findings.    Assessment/Narrative         The placement was negative for: blood aspirated and site bleedingInjection painful: Sedated.       Paresthesias: No.       Bolus given via needle..        Secured via.        Insertion/Infusion Method: Single Shot       Injection made incrementally with aspirations every 5 mL.    Medication(s) Administered   Bupivacaine 0.25% w/ 1:200K Epi (Injection) - Injection   40 mL - 6/7/2022 2:14:00 PM  Medication Administration Time: 6/7/2022 2:14 PM     Comments:  20cc per side      
0

## 2022-06-08 LAB — HGB BLD-MCNC: 9.9 G/DL (ref 11.7–15.7)

## 2022-06-08 PROCEDURE — 120N000001 HC R&B MED SURG/OB

## 2022-06-08 PROCEDURE — 250N000013 HC RX MED GY IP 250 OP 250 PS 637: Performed by: OBSTETRICS & GYNECOLOGY

## 2022-06-08 PROCEDURE — 85018 HEMOGLOBIN: CPT | Performed by: OBSTETRICS & GYNECOLOGY

## 2022-06-08 PROCEDURE — 250N000011 HC RX IP 250 OP 636: Performed by: OBSTETRICS & GYNECOLOGY

## 2022-06-08 PROCEDURE — 250N000011 HC RX IP 250 OP 636: Performed by: ANESTHESIOLOGY

## 2022-06-08 PROCEDURE — 36415 COLL VENOUS BLD VENIPUNCTURE: CPT | Performed by: OBSTETRICS & GYNECOLOGY

## 2022-06-08 RX ORDER — HYDROXYZINE HYDROCHLORIDE 25 MG/1
25 TABLET, FILM COATED ORAL EVERY 6 HOURS PRN
Status: DISCONTINUED | OUTPATIENT
Start: 2022-06-08 | End: 2022-06-09 | Stop reason: HOSPADM

## 2022-06-08 RX ADMIN — NALBUPHINE HYDROCHLORIDE 5 MG: 10 INJECTION, SOLUTION INTRAMUSCULAR; INTRAVENOUS; SUBCUTANEOUS at 03:16

## 2022-06-08 RX ADMIN — ACETAMINOPHEN 975 MG: 325 TABLET ORAL at 15:47

## 2022-06-08 RX ADMIN — ACETAMINOPHEN 975 MG: 325 TABLET ORAL at 09:07

## 2022-06-08 RX ADMIN — SIMETHICONE 80 MG: 80 TABLET, CHEWABLE ORAL at 07:00

## 2022-06-08 RX ADMIN — ACETAMINOPHEN 975 MG: 325 TABLET ORAL at 22:51

## 2022-06-08 RX ADMIN — HYDROXYZINE HYDROCHLORIDE 25 MG: 25 TABLET, FILM COATED ORAL at 22:51

## 2022-06-08 RX ADMIN — ACETAMINOPHEN 975 MG: 325 TABLET ORAL at 03:12

## 2022-06-08 RX ADMIN — KETOROLAC TROMETHAMINE 30 MG: 30 INJECTION, SOLUTION INTRAMUSCULAR; INTRAVENOUS at 05:01

## 2022-06-08 RX ADMIN — SERTRALINE HYDROCHLORIDE 100 MG: 50 TABLET, FILM COATED ORAL at 22:50

## 2022-06-08 RX ADMIN — SIMETHICONE 80 MG: 80 TABLET, CHEWABLE ORAL at 11:42

## 2022-06-08 RX ADMIN — KETOROLAC TROMETHAMINE 30 MG: 30 INJECTION, SOLUTION INTRAMUSCULAR; INTRAVENOUS at 11:42

## 2022-06-08 RX ADMIN — SENNOSIDES AND DOCUSATE SODIUM 1 TABLET: 50; 8.6 TABLET ORAL at 09:07

## 2022-06-08 RX ADMIN — SENNOSIDES AND DOCUSATE SODIUM 1 TABLET: 50; 8.6 TABLET ORAL at 22:51

## 2022-06-08 RX ADMIN — IBUPROFEN 800 MG: 800 TABLET ORAL at 18:19

## 2022-06-08 RX ADMIN — IBUPROFEN 800 MG: 800 TABLET ORAL at 22:51

## 2022-06-08 RX ADMIN — Medication: at 18:22

## 2022-06-08 ASSESSMENT — ACTIVITIES OF DAILY LIVING (ADL)
ADLS_ACUITY_SCORE: 18

## 2022-06-08 NOTE — ANESTHESIA POSTPROCEDURE EVALUATION
Patient: Erna Colunga    Procedure: Procedure(s):  REPEAT  SECTION AND TUBAL LIGATION VIA BILATERAL SALPINGECTOMY       Anesthesia Type:  Spinal    Note:  Disposition: Admission   Postop Pain Control: Uneventful            Sign Out: Well controlled pain   PONV: No   Neuro/Psych: Uneventful            Sign Out: Acceptable/Baseline neuro status   Airway/Respiratory: Uneventful            Sign Out: Acceptable/Baseline resp. status   CV/Hemodynamics: Uneventful            Sign Out: Acceptable CV status; No obvious hypovolemia; No obvious fluid overload   Other NRE: NONE   DID A NON-ROUTINE EVENT OCCUR? No           Last vitals:  Vitals:    22 1746 22 1840 22   BP:  118/64 112/68   Pulse: 98 97 95   Resp:  16 16   Temp:   36.6  C (97.8  F)   SpO2: 97% 97% 97%       Electronically Signed By: Usman East MD  2022  9:05 PM

## 2022-06-08 NOTE — PROGRESS NOTES
2022  MD Postpartum Progress Note: Postop day 1       DAILY NOTE - POSTPARTUM DAY 1    SUBJECTIVE: Patient is doing well, pain is controlled and she is tolerated her diet.  Ambulating and voiding without difficulty.  Normal amount of lochia without heavy bleeding.  Baby is doing well.    OBJECTIVE:  Vitals:    22 0300 22 0500 22 0655 22 0744   BP: 105/62  115/71 95/59   BP Location: Left arm  Left arm Left arm   Patient Position: Semi-Bob's  Sitting    Cuff Size: Adult Regular  Adult Regular    Pulse: 74  75 73   Resp: 16 16  17   Temp:    98.2  F (36.8  C)   TempSrc:    Oral   SpO2: 99% 97%  97%     GENERAL: healthy, alert, and no distress.  Appropriate mood and affect.    LABS:  Hemoglobin   Date Value Ref Range Status   2022 9.9 (L) 11.7 - 15.7 g/dL Final   2022 11.1 (L) 11.7 - 15.7 g/dL Final   2013 12.4 11.7 - 15.7 g/dL Final   10/18/2013 12.8 11.7 - 15.7 g/dL Final     ASSESSMENT:  Status post  delivery, postop day 1, doing well.  Normal healing wound.  No immediate surgical complications identified.     PLAN:   Ambulation encouraged  Monitor wound for signs of infection  Pain control measures as needed  Anticipate discharge tomorrow    Rolanda Vasquez MD

## 2022-06-08 NOTE — CONSULTS
"ACUPUNCTURIST TREATMENT NOTE    Name: Erna Colunga  :  1985  MRN:  [id number]    Acupuncture Treatment  Patient Type: Maternity  Intervention Reason: Pain, Pain 2  Pain Location: (R) abdomen  Pre-session Pain Ratin  Post-session Pain Ratin  Pain 2 Location: Low back  Pre-session Pain 2 Ratin  Post-session Pain 2 Ratin  Patient complaint:: (R) abdominal and low back pain  Initial insertions: Tb 5, Gb 41, St 34, 36, Sp 10, Yin harris, Du 20         \"Risks and benefits of acupuncture were discussed with patient. Consent for treatment was given. We thank you for the referral.\"     Kamran Diaz    Date:  2022  Time:  12:19 PM    "

## 2022-06-09 VITALS
SYSTOLIC BLOOD PRESSURE: 115 MMHG | TEMPERATURE: 98.4 F | DIASTOLIC BLOOD PRESSURE: 70 MMHG | RESPIRATION RATE: 16 BRPM | OXYGEN SATURATION: 98 % | HEART RATE: 88 BPM

## 2022-06-09 LAB
PATH REPORT.COMMENTS IMP SPEC: NORMAL
PATH REPORT.COMMENTS IMP SPEC: NORMAL
PATH REPORT.FINAL DX SPEC: NORMAL
PATH REPORT.GROSS SPEC: NORMAL
PATH REPORT.MICROSCOPIC SPEC OTHER STN: NORMAL
PATH REPORT.RELEVANT HX SPEC: NORMAL
PHOTO IMAGE: NORMAL

## 2022-06-09 PROCEDURE — 250N000013 HC RX MED GY IP 250 OP 250 PS 637: Performed by: OBSTETRICS & GYNECOLOGY

## 2022-06-09 RX ORDER — OXYCODONE HYDROCHLORIDE 5 MG/1
5 TABLET ORAL EVERY 4 HOURS PRN
Qty: 20 TABLET | Refills: 0 | Status: SHIPPED | OUTPATIENT
Start: 2022-06-09 | End: 2024-02-23

## 2022-06-09 RX ORDER — IBUPROFEN 800 MG/1
800 TABLET, FILM COATED ORAL EVERY 6 HOURS
Qty: 30 TABLET | Refills: 0 | Status: SHIPPED | OUTPATIENT
Start: 2022-06-09 | End: 2024-02-23

## 2022-06-09 RX ORDER — AMOXICILLIN 250 MG
1 CAPSULE ORAL 2 TIMES DAILY
Qty: 30 TABLET | Refills: 0 | Status: SHIPPED | OUTPATIENT
Start: 2022-06-09 | End: 2024-02-23

## 2022-06-09 RX ADMIN — IBUPROFEN 800 MG: 800 TABLET ORAL at 10:16

## 2022-06-09 RX ADMIN — SENNOSIDES AND DOCUSATE SODIUM 1 TABLET: 50; 8.6 TABLET ORAL at 10:16

## 2022-06-09 RX ADMIN — ACETAMINOPHEN 975 MG: 325 TABLET ORAL at 04:02

## 2022-06-09 RX ADMIN — IBUPROFEN 800 MG: 800 TABLET ORAL at 04:02

## 2022-06-09 RX ADMIN — ACETAMINOPHEN 975 MG: 325 TABLET ORAL at 10:16

## 2022-06-09 RX ADMIN — SERTRALINE HYDROCHLORIDE 100 MG: 50 TABLET, FILM COATED ORAL at 10:16

## 2022-06-09 ASSESSMENT — ACTIVITIES OF DAILY LIVING (ADL)
ADLS_ACUITY_SCORE: 18

## 2022-06-09 NOTE — PLAN OF CARE
Problem: Plan of Care - These are the overarching goals to be used throughout the patient stay.    Goal: Plan of Care Review/Shift Note  Outcome: Ongoing, Progressing   Pt having incision/uterine pain, given scheduled medications. Restarted at home sertraline and hydroxizine per pt request, both given at HS. Incision with meiplex in place, dried drainage noted. Uterus firm. Scant amount of rubra lochia. Pt tearful after baby incident at HS, wanted to follow up with social work/therapist and talk with MD in the AM. Was able to get some rest overnight. Educated on baby sleeping in bassinet and pt discussed understanding. Good support system with significant other in the room.

## 2022-06-09 NOTE — CONSULTS
"ACUPUNCTURIST TREATMENT NOTE    Name: Erna Colunga  :  1985  MRN:  [id number]    Acupuncture Treatment  Patient Type: Maternity  Intervention Reason: Pain  Pain Location: Abdomen  Pre-session Pain Ratin  Post-session Pain Ratin  Pain 2 Location: Low back  Pre-session Pain 2 Ratin  Post-session Pain 2 Ratin  Patient complaint:: Relaxation, abdominal pain  Initial insertions: Tb 5, Gb 41, Yin harris, Du 20, Ht 7, St 36         \"Risks and benefits of acupuncture were discussed with patient. Consent for treatment was given. We thank you for the referral.\"     Kamran Diaz    Date:  2022  Time:  11:35 AM    "

## 2022-06-09 NOTE — PROGRESS NOTES
"Outreach Note for EPIC    Chart reviewed, discharge plan discussed with patient, needs assessed. Patient verbalizes understanding of plan, [facility], [facility] nurse visit planned for , Home Care Intake updated. Patient and , \"Darrin\", phone number is reported as correct in EMR.    Patient states she has good support at home, has baby care essentials, and feels ready to discharge today. Outreach RN will continue to follow and assist if needed with discharge plan. No additional needs identified at this time.        "

## 2022-06-09 NOTE — DISCHARGE SUMMARY
HOSPITAL DISCHARGE SUMMARY -  Birth    Patient Name: Erna Colunga   YOB: 1985  Age: 37 year old  Medical Record Number: [id number]  Admission Date: 2022  Discharge Date: 2022    Erna Colunga will be discharged from Hospital to home. She feels well and has no emotional concerns today. I encouraged her to schedule follow up with her therapist soon. Pain is well controlled with current medications. She is voiding without difficulty. The baby is doing well.     /74 (BP Location: Left arm, Patient Position: Semi-Bob's, Cuff Size: Adult Regular)   Pulse 74   Temp 98.5  F (36.9  C) (Oral)   Resp 16   SpO2 98%   Breastfeeding Unknown     REASON FOR ADMISSION: Labor and Delivery    MEDS: see  Med rec    PROCEDURES:  Lower transverse     HISTORY OF PRESENT ILLNESS AND HOSPITAL COURSE: This is a 37 year old female who underwent  section without complication. The postoperative course was unremarkable. She was instructed to avoid any heavy lifting and to call if she had any difficulties.     DISCHARGE INSTRUCTION: Discharge to home with instructions to follow up in the office in 2 weeks for a post-operative assessment.  The couple is aware that they can call the clinic with any concerns.     Tatyana Zhang MD

## 2022-06-09 NOTE — PLAN OF CARE
Problem: Pain (Postpartum  Delivery)  Goal: Acceptable Pain Control  Intervention: Prevent or Manage Pain  Recent Flowsheet Documentation  Taken 2022 1142 by Debbie Kearney, RN  Pain Management Interventions: medication (see MAR)  Taken 2022 0907 by Debbie Kearney, RN  Pain Management Interventions:   medication (see MAR)   cold applied   Patient rates pain 5/10. Had gas pains. Now passing flatus. Feels better. Continue to monitor.

## 2022-06-09 NOTE — CONSULTS
Integrative Therapy Consult    Healing PresenceYes  Essential Oils: Topical (EO/Topical Oil)     Yesenia -  HC, Lavender Massage Oil - HC       Healing Music:       Breathwork:       Guided Imagery:       Acupressure:       Oshibori:       Energy Therapy:       Healing Touch:       Reiki:       Qi Gong:     Massage: Foot      Targeted Massage:    Sleep Promotion:       Other Therapy:       Intervention Reason: Edema     Pre and Post Session Scores: Patient Desires Treatment: yes                             Delivery:         Referrals:      Rossy Durham

## 2022-09-24 ENCOUNTER — HOSPITAL ENCOUNTER (EMERGENCY)
Facility: CLINIC | Age: 37
Discharge: HOME | End: 2022-09-24
Admitting: PHYSICIAN ASSISTANT
Payer: COMMERCIAL

## 2022-09-24 VITALS
WEIGHT: 170 LBS | BODY MASS INDEX: 26.68 KG/M2 | SYSTOLIC BLOOD PRESSURE: 121 MMHG | TEMPERATURE: 97.6 F | HEART RATE: 68 BPM | HEIGHT: 67 IN | RESPIRATION RATE: 16 BRPM | OXYGEN SATURATION: 99 % | DIASTOLIC BLOOD PRESSURE: 89 MMHG

## 2022-09-24 DIAGNOSIS — H11.439: ICD-10-CM

## 2022-09-24 DIAGNOSIS — H53.149 PHOTOPHOBIA: ICD-10-CM

## 2022-09-24 DIAGNOSIS — H20.9 IRITIS: ICD-10-CM

## 2022-09-24 PROCEDURE — 250N000009 HC RX 250: Performed by: PHYSICIAN ASSISTANT

## 2022-09-24 PROCEDURE — 99284 EMERGENCY DEPT VISIT MOD MDM: CPT

## 2022-09-24 RX ORDER — TETRACAINE HYDROCHLORIDE 5 MG/ML
1-2 SOLUTION OPHTHALMIC ONCE
Status: COMPLETED | OUTPATIENT
Start: 2022-09-24 | End: 2022-09-24

## 2022-09-24 RX ORDER — ERYTHROMYCIN 5 MG/G
0.5 OINTMENT OPHTHALMIC 4 TIMES DAILY
Qty: 10 G | Refills: 0 | Status: SHIPPED | OUTPATIENT
Start: 2022-09-24 | End: 2022-09-29

## 2022-09-24 RX ORDER — TOBRAMYCIN 3 MG/ML
1-2 SOLUTION/ DROPS OPHTHALMIC 4 TIMES DAILY
Qty: 2 ML | Refills: 0 | Status: SHIPPED | OUTPATIENT
Start: 2022-09-24 | End: 2022-09-29

## 2022-09-24 RX ADMIN — TETRACAINE HYDROCHLORIDE 2 DROP: 5 SOLUTION OPHTHALMIC at 18:30

## 2022-09-24 RX ADMIN — FLUORESCEIN SODIUM 1 STRIP: 1 STRIP OPHTHALMIC at 18:30

## 2022-09-24 ASSESSMENT — ENCOUNTER SYMPTOMS
PHOTOPHOBIA: 1
EYE REDNESS: 1
EYE PAIN: 1
FEVER: 0

## 2022-09-24 ASSESSMENT — ACTIVITIES OF DAILY LIVING (ADL): ADLS_ACUITY_SCORE: 35

## 2022-09-24 ASSESSMENT — VISUAL ACUITY
OU: NORMAL
OD: 20/50;WITHOUT CORRECTIVE LENSES
OS: 20/25;WITHOUT CORRECTIVE LENSES

## 2022-09-24 NOTE — Clinical Note
Erna Colunga was seen and treated in our emergency department on 9/24/2022.  She may return to work on 09/27/2022.       If you have any questions or concerns, please don't hesitate to call.      Kami Blue PA-C

## 2022-09-24 NOTE — DISCHARGE INSTRUCTIONS
Start the antibiotics as prescribed:  Erythromycin ointment 4 times daily for 5 days AND  Tobramycin 2 drops 4 times daily for 5 days.    Call [facility] on Monday to schedule follow up.    Do not wear contacts until your symptoms have improved, you have completed antibiotics, and been cleared by ophthalmology.    Return to the emergency department if you develop worsening redness, eye swelling, vision changes, pain, or any other concerning symptoms. We would be happy to see you.

## 2022-09-24 NOTE — ED TRIAGE NOTES
Patient was sent her from the clinic to r/o iritis. She started to have redness and irritation for one week. Today is when the pain started. She does wear contact. She also has sensitivity to light. She did receive eye drop to help with pain. She also complains of pressure to her cheek bone and pressure.

## 2022-11-20 ENCOUNTER — HEALTH MAINTENANCE LETTER (OUTPATIENT)
Age: 37
End: 2022-11-20

## 2023-04-15 ENCOUNTER — HEALTH MAINTENANCE LETTER (OUTPATIENT)
Age: 38
End: 2023-04-15

## 2024-02-13 ENCOUNTER — APPOINTMENT (OUTPATIENT)
Dept: RADIOLOGY | Facility: CLINIC | Age: 39
End: 2024-02-13
Attending: EMERGENCY MEDICINE
Payer: COMMERCIAL

## 2024-02-13 ENCOUNTER — HOSPITAL ENCOUNTER (EMERGENCY)
Facility: CLINIC | Age: 39
Discharge: HOME | End: 2024-02-13
Payer: COMMERCIAL

## 2024-02-13 VITALS
HEART RATE: 84 BPM | DIASTOLIC BLOOD PRESSURE: 69 MMHG | SYSTOLIC BLOOD PRESSURE: 117 MMHG | OXYGEN SATURATION: 98 % | BODY MASS INDEX: 30.53 KG/M2 | TEMPERATURE: 98 F | RESPIRATION RATE: 16 BRPM | HEIGHT: 66 IN | WEIGHT: 190 LBS

## 2024-02-13 DIAGNOSIS — S62.650A NONDISPLACED FRACTURE OF MIDDLE PHALANX OF RIGHT INDEX FINGER, INITIAL ENCOUNTER FOR CLOSED FRACTURE: Primary | ICD-10-CM

## 2024-02-13 PROCEDURE — 99283 EMERGENCY DEPT VISIT LOW MDM: CPT | Mod: 25

## 2024-02-13 PROCEDURE — 73140 X-RAY EXAM OF FINGER(S): CPT | Mod: RT

## 2024-02-13 PROCEDURE — 26720 TREAT FINGER FRACTURE EACH: CPT

## 2024-02-13 NOTE — ED TRIAGE NOTES
Jammed her her right index finger today.  C/O pain and swelling with some tingling in her fingertip.     Triage Assessment (Adult)       Row Name 02/13/24 4329          Triage Assessment    Airway WDL WDL        Respiratory WDL    Respiratory WDL WDL        Skin Circulation/Temperature WDL    Skin Circulation/Temperature WDL WDL        Cardiac WDL    Cardiac WDL WDL        Peripheral/Neurovascular WDL    Peripheral Neurovascular WDL WDL        Cognitive/Neuro/Behavioral WDL    Cognitive/Neuro/Behavioral WDL WDL

## 2024-02-14 ENCOUNTER — TELEPHONE (OUTPATIENT)
Dept: ORTHOPEDICS | Facility: CLINIC | Age: 39
End: 2024-02-14
Payer: COMMERCIAL

## 2024-02-14 NOTE — ED PROVIDER NOTES
Emergency Department Encounter  [facility] EMERGENCY ROOM  Erna Colunga   AGE: 38 year old SEX: female  YOB: 1985  MRN: [id number]      EVALUATION DATE & TIME: No admission date for patient encounter. ; PCP: Gwen, [facility]   ED PROVIDER: Veronica Fung PA-C    CHIEF COMPLAINT: Finger      MEDICAL DECISION MAKING   Erna Colunga is an otherwise healthy right-hand-dominant 38-year-old female who presents to the ED for evaluation of a finger injury she sustained this evening while handling groceries when she accidentally jammed her finger.  Pain is localized to the right index finger.  Patient denies any additional trauma.    Vitals reviewed and hemodynamically stable, afebrile. On exam, patient is well-appearing and in no acute distress. Sensation and motor function are intact.  Patient is able to flex and extend right second digit at DIP, PIP, and MCP.  There is reduced active range of motion at the second digit PIP.  Distal pulses intact.  There are some areas of decreased sensory on the tip of right digit.  No overlying skin lesions or laceration.    Patient was found to have a closed nondisplaced avulsion fracture of the volar base of the second middle phalanx.  X-ray imaging shows normal joint spacing and alignment.  Patient was splinted in the emergency department (see procedure note).    Patient is otherwise well-appearing, hemodynamically stable, and is without evidence of neurovascular injury or compartment syndrome.  Referral for emergent follow-up with hand surgery placed. Plan to discharge home with symptomatic care including rest, ice, elevation and OTC analgesics. Patient was provided with clear, written instructions of potential danger signs and where and when to return for emergency care or re-evaluation.    Medical Decision Making  Obtained supplemental history:Supplemental history obtained?: No  Reviewed external records:  External records reviewed?: Documented in chart  Care impacted by chronic illness:N/A  Care significantly affected by social determinants of health:Access to Medical Care  Did you consider but not order tests?: Work up considered but not performed and documented in chart, if applicable  Did you interpret images independently?: Independent interpretation of ECG and images noted in documentation, when applicable.  Consultation discussion with other provider:Did you involve another provider (consultant, , pharmacy, etc.)?: No  Discharge. No recommendations on prescription strength medication(s). See documentation for any additional details.    FINAL IMPRESSION       ICD-10-CM    1. Nondisplaced fracture of middle phalanx of right index finger, initial encounter for closed fracture  S62.650A Orthopedic  Referral          ED COURSE   7:03 PM I met and introduced myself to the patient. I gathered initial history and performed my physical exam. We discussed plan for initial workup.   7:12 PM I placed a splint on the finger and we discussed discharge, follow up, and reasons to return to the ED.     MEDICATIONS GIVEN IN THE EMERGENCY DEPARTMENT:   Medications - No data to display   NEW PRESCRIPTIONS STARTED AT TODAY'S ED VISIT:  New Prescriptions    No medications on file     =================================================================     HISTORY OF PRESENT ILLNESS   Patient information was obtained from: patient   Use of Intrepreter: N/A     Erna Colunga is a 38 year old female with a pertinent history of hypercholesterolemia who presents to the ED by self walk-in for evaluation of finger injury.     Patient reports this evening, she was busy handling groceries and talking with her kids when she accidentally slammed her right index digit into something. Since then, she endorses bruising, swelling, pain, and tingling to the digit. She otherwise denies any falls or head trauma. She is right hand dominant.  "She doesn't take any routine medications. She denies history of DM2, CAD, or HTN. There were no other concerns/complaints at this time.      MEDICAL HISTORY     Past Medical History:   Diagnosis Date    Childhood asthma     Herpes     NO ACTIVE PROBLEMS        Past Surgical History:   Procedure Laterality Date     SECTION, TUBAL LIGATION, COMBINED Bilateral 2022    Procedure: REPEAT  SECTION AND TUBAL LIGATION VIA BILATERAL SALPINGECTOMY;  Surgeon: Sudha Rgasdale MD;  [facility]    [facility]  DELIVERY ONLY  2007       Family History   Problem Relation Age of Onset    Diabetes Maternal Uncle     Diabetes Maternal Uncle     Hypertension Mother     Hypertension Maternal Aunt     Hypertension Maternal Uncle     Cerebrovascular Disease Maternal Grandfather     Alcohol/Drug Maternal Uncle     Alcohol/Drug Father     Heart Disease Maternal Grandfather     Heart Disease Paternal Grandfather     Diabetes Other         cousin    Lipids Maternal Aunt     Gynecology Sister         ov cyst    Asthma Son        Social History     Tobacco Use    Smoking status: Never    Smokeless tobacco: Never   Substance Use Topics    Alcohol use: No    Drug use: No       ibuprofen (ADVIL/MOTRIN) 800 MG tablet  oxyCODONE (ROXICODONE) 5 MG tablet  senna-docusate (SENOKOT-S/PERICOLACE) 8.6-50 MG tablet  sertraline (ZOLOFT) 100 MG tablet        PHYSICAL EXAM   VITALS:  Patient Vitals for the past 24 hrs:   BP Temp Temp src Pulse Resp SpO2 Height Weight   24 1738 117/69 98  F (36.7  C) Temporal 84 16 98 % 1.676 m (5' 6\") 86.2 kg (190 lb)     CONSITUTIONAL: Generally well-appearing female , in no acute distress. Well developed, nourished.  HENT: Normocephalic, atraumatic, mucous membranes moist, nose normal.   MSK: Moving all 4 extremities intentionally and without pain. No obvious deformity.  Pain with " palpation at MCP joint of second digit.  There is active flexion and extension of right second digit at DIP, PIP, and MCP with reduced active flexion at the second digit PIP.  SKIN: Warm, dry, no rash.  No lacerations or abrasions.  NEURO:  AxOx4. CN II-XII grossly intact, but not individually tested. No focal neurological deficits.     LABS AND IMAGING   All pertinent labs reviewed and interpreted  Labs Ordered and Resulted from Time of ED Arrival to Time of ED Departure - No data to display    XR Finger Right G/E 2 Views   Final Result   IMPRESSION: Acute nondisplaced avulsion fracture at the volar base of the second middle phalanx. Surrounding soft tissue swelling. There is normal joint spacing and alignment.             PROCEDURES     PROCEDURE: Splint Placement   INDICATIONS: right second middle phalanx fracture   PROCEDURE PROVIDER: Veronica Fung PA-C   NOTE:  A  volar R finger  splint made of  aluminum foam  was applied to the Right upper extremity by the above provider. As noted in the physical exam, distal CMS was had sensory deficit prior to placement. The splint was checked and the fit was adjusted to ensure proper positioning after placement. Sensation and circulation, as well as motor function, are unchanged after splint placement and the patient is more comfortable with the splint in place.       I, Faith Beauchamp, am serving as a scribe to document services personally performed by Veronica Fung PA-C, based on my observation and the provider's statements to me. IVeronica PA-C attest that Faith Beauchamp is acting in a scribe capacity, has observed my performance of the services and has documented them in accordance with my direction.     Veronica Fung PA-C   Emergency Medicine   [facility] EMERGENCY ROOM  [address] [id number] [phone]  Dept: [phone]      Veronica Fung PA-C  02/13/24 0410

## 2024-02-14 NOTE — DISCHARGE INSTRUCTIONS
Today you were seen in the emergency department for finger pain. X-ray imaging of your right hand shows you have an avulsion fracture on your second middle phalanx.    PAIN MANAGEMENT  Ibuprofen (Advil) - 600 MG every 6 hours  Acetaminophen (Tylenol) - 500-1,000 MG every 6 hours    CARE AT HOME: the first 48-72 hours  Avoid activities that cause pain!  Follow the RICE protocol to help speed recovery:  Rest - Avoid putting weight on the painful joint/extremity and avoid activities that worsen the pain.  Ice - Use cold packs for 20 minutes at a time, every two to four hours, particularly after physical activity.   Compression - Lightly wrap the injured area in a soft bandage or ACE wrap until the inflammation dies down. Be sure not to compress the injured area too tightly.  Elevation - Elevate the injury higher than your heart while resting to reduce swelling  Keep the finger splint on your finger to add stability to your injured joint/extremity. This should be worn while bathing and sleeping until you are seen by hand doctor.      FOLLOW UP  If symptoms do not improve within five days, please follow up with hand doctor.  I have placed a referral within our system for you to see a hand surgeon within the next 7 to 10 days. A representative will call you within 2 business days to help you schedule your appointment, or you may contact the [facility] Representative at: [phone].     If symptoms worsen or you develop any of the following, please do not hesitate to come back to this emergency department for additional evaluation.   Call 911 anytime you think you may need emergency care. For example, call if:    Your finger is cool or pale or changes color.   Call your doctor now or seek immediate medical care if:    Your pain gets much worse.     You have tingling, weakness, or numbness in your finger.     You have signs of infection, such as:  Increased pain, swelling, warmth, or redness.  Red streaks leading from  the area.  Pus draining from the area.  Swollen lymph nodes in your neck, armpits, or groin.  A fever.   Watch closely for changes in your health, and be sure to contact your doctor if:    Your finger is not steadily improving.

## 2024-02-14 NOTE — TELEPHONE ENCOUNTER
Patient Contacted for the patient to call back and schedule the following:    Appointment type: New  Provider: Dr. Lopez  Return date: 2/16

## 2024-02-15 ENCOUNTER — TELEPHONE (OUTPATIENT)
Dept: ORTHOPEDICS | Facility: CLINIC | Age: 39
End: 2024-02-15
Payer: COMMERCIAL

## 2024-02-15 NOTE — TELEPHONE ENCOUNTER
Patient Contacted for the patient to call back and schedule the following:    Appointment type: New  Provider: Dr. Dick  Return date: R/S from 2/15 to 2/23

## 2024-02-21 DIAGNOSIS — S69.90XA FINGER INJURY: Primary | ICD-10-CM

## 2024-02-23 ENCOUNTER — OFFICE VISIT (OUTPATIENT)
Dept: ORTHOPEDICS | Facility: CLINIC | Age: 39
End: 2024-02-23
Payer: COMMERCIAL

## 2024-02-23 ENCOUNTER — PRE VISIT (OUTPATIENT)
Dept: ORTHOPEDICS | Facility: CLINIC | Age: 39
End: 2024-02-23

## 2024-02-23 ENCOUNTER — ANCILLARY PROCEDURE (OUTPATIENT)
Dept: GENERAL RADIOLOGY | Facility: CLINIC | Age: 39
End: 2024-02-23
Attending: FAMILY MEDICINE
Payer: COMMERCIAL

## 2024-02-23 DIAGNOSIS — S62.650A NONDISPLACED FRACTURE OF MIDDLE PHALANX OF RIGHT INDEX FINGER, INITIAL ENCOUNTER FOR CLOSED FRACTURE: Primary | ICD-10-CM

## 2024-02-23 DIAGNOSIS — S62.650A NONDISPLACED FRACTURE OF MIDDLE PHALANX OF RIGHT INDEX FINGER, INITIAL ENCOUNTER FOR CLOSED FRACTURE: ICD-10-CM

## 2024-02-23 PROCEDURE — 73140 X-RAY EXAM OF FINGER(S): CPT | Mod: RT | Performed by: RADIOLOGY

## 2024-02-23 PROCEDURE — 99203 OFFICE O/P NEW LOW 30 MIN: CPT | Performed by: FAMILY MEDICINE

## 2024-02-23 NOTE — PROGRESS NOTES
ASSESSMENT/PLAN:    58-year-old RHD female with a right index finger fracture.  1.right index finger fracture-  Physical- will be coming up  Vit D low in the past, recommended recheck on vit D  Wear splint all the time if possible  Ice  Tylenol  Splint- hold with tape, coban    RTC 2 weeks with re-x-ray        Pt is a 38 year old female here today for:     Right index finger fracture. Patient reports that 1 week ago, she was carrying several bags in her hands and jammed her finger again the door with an axial load and felt the finger get forced into extension. She started to notice swelling and bruising. She was seen in the ED the same day, they obtained XR and placed in a splint. She is right hand dominant. She has 2 children under the age of 2 years, 5 total with the oldest 16. 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   Hard to take care of kids, hurts if finger is up against anything.  Showered and rushed out of house, forgot her splint  Calcium and vit D  No Ibuprofen, had gastric sleeve in 2017    HPI:   right finger:  Location: Right index finger    Duration:10 days   Trauma/ Fall? Yes, jammed again door   Able to walk? NA  Swelling? Yes   Bruising? Yes  Numbness/ Tingling? None   Weakness? Yes   Instability? No  Snapping/Clicking? No  Imaging? Yes, 2/13/24 and new XR today   Treatment? Splint        EXAM:   right WRIST/HAND: index finger  Inspection: Swelling - +; Atrophy - none   Sensation: intact in median, radial, ulnar distribution   ROM:   Wrist: flexion- full/ extension- full/ pronation- full/ supination- full;   radial deviation - full; ulnar deviation- full   Hand: Full flexion at PIP/DIP, finger abduction/ adduction.   Strength: 4/5 in all motions   Bony tenderness:   Wrist: Carpal bones: none; Snuffbox: none   Hand: Metacarpals: nontender; Phalanges: volar right index finger   Tendons: FDS/FDP/Extensor mechanism intact   Maneuvers: -Tinel's/Phalen; -Finkelstein. -TFCC grind test   Other: No  nodules palpated in palmar aspect.     Past Medical History:   Diagnosis Date    Childhood asthma     Herpes 2010    type II    NO ACTIVE PROBLEMS       Past Surgical History:   Procedure Laterality Date     SECTION, TUBAL LIGATION, COMBINED Bilateral 2022    Procedure: REPEAT  SECTION AND TUBAL LIGATION VIA BILATERAL SALPINGECTOMY;  Surgeon: Sudha Ragsdale MD;  Location: [facility] TOOTH EXTRACTION W/FORCEP      [facility] US GUIDE FOR CHORIONIC VILLUS SAMPLING  2014    [facility]  DELIVERY ONLY        Current Outpatient Medications   Medication Sig Dispense Refill    sertraline (ZOLOFT) 100 MG tablet Take 100 mg by mouth daily      ibuprofen (ADVIL/MOTRIN) 800 MG tablet Take 1 tablet (800 mg) by mouth every 6 hours 30 tablet 0    oxyCODONE (ROXICODONE) 5 MG tablet Take 1 tablet (5 mg) by mouth every 4 hours as needed for moderate to severe pain 20 tablet 0    senna-docusate (SENOKOT-S/PERICOLACE) 8.6-50 MG tablet Take 1 tablet by mouth 2 times daily 30 tablet 0      Allergies   Allergen Reactions    No Known Allergies       ROS:   Gen- no fevers/chills   Rheum - no morning stiffness   Derm - no rash/ redness   Neuro - no numbness, no tingling   Remainder of ROS negative.     Exam:   LMP 2024 (Approximate)      Xray of right finger on 2024 at [facility] [place] location - films personally reviewed with patient at time of visit     My impression: acute nondisplaced avulsion fracture at the volar base of the second middle phalanx

## 2024-02-23 NOTE — TELEPHONE ENCOUNTER
DIAGNOSIS: Nondisplaced fracture of middle phalanx of right index finger     APPOINTMENT DATE: 2.23.24   NOTES STATUS DETAILS   DISCHARGE REPORT from the ER Internal 2.13.24  Antonieta Mukherjee   MEDICATION LIST Internal    XRAYS (IMAGES & REPORTS) Internal 2.13.24  XR Finger Right

## 2024-02-23 NOTE — LETTER
2/23/2024      RE: Erna Colunga  [address]  [place] [address]     Dear Colleague,    Thank you for referring your patient, Erna Colunga, to the [facility] [place]. Please see a copy of my visit note below.    ASSESSMENT/PLAN:    58-year-old RHD female with a right index finger fracture.  1.right index finger fracture-  Physical- will be coming up  Vit D low in the past, recommended recheck on vit D  Wear splint all the time if possible  Ice  Tylenol  Splint- hold with tape, coban    RTC 2 weeks with re-x-ray        Pt is a 38 year old female here today for:     Right index finger fracture. Patient reports that 1 week ago, she was carrying several bags in her hands and jammed her finger again the door with an axial load and felt the finger get forced into extension. She started to notice swelling and bruising. She was seen in the ED the same day, they obtained XR and placed in a splint. She is right hand dominant. She has 2 children under the age of 2 years, 5 total with the oldest 16. 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   Hard to take care of kids, hurts if finger is up against anything.  Showered and rushed out of house, forgot her splint  Calcium and vit D  No Ibuprofen, had gastric sleeve in 2017    HPI:   right finger:  Location: Right index finger    Duration:10 days   Trauma/ Fall? Yes, jammed again door   Able to walk? NA  Swelling? Yes   Bruising? Yes  Numbness/ Tingling? None   Weakness? Yes   Instability? No  Snapping/Clicking? No  Imaging? Yes, 2/13/24 and new XR today   Treatment? Splint        EXAM:   right WRIST/HAND: index finger  Inspection: Swelling - +; Atrophy - none   Sensation: intact in median, radial, ulnar distribution   ROM:   Wrist: flexion- full/ extension- full/ pronation- full/ supination- full;   radial deviation - full; ulnar deviation- full   Hand: Full flexion at PIP/DIP, finger abduction/ adduction.   Strength: 4/5 in all  motions   Bony tenderness:   Wrist: Carpal bones: none; Snuffbox: none   Hand: Metacarpals: nontender; Phalanges: volar right index finger   Tendons: FDS/FDP/Extensor mechanism intact   Maneuvers: -Tinel's/Phalen; -Finkelstein. -TFCC grind test   Other: No nodules palpated in palmar aspect.     Past Medical History:   Diagnosis Date    Childhood asthma     Herpes 2010    type II    NO ACTIVE PROBLEMS       Past Surgical History:   Procedure Laterality Date     SECTION, TUBAL LIGATION, COMBINED Bilateral 2022    Procedure: REPEAT  SECTION AND TUBAL LIGATION VIA BILATERAL SALPINGECTOMY;  Surgeon: Sudha Ragsdale MD;  Location: [facility] TOOTH EXTRACTION W/FORCEP      HC US GUIDE FOR CHORIONIC VILLUS SAMPLING  2014    [facility]  DELIVERY ONLY        Current Outpatient Medications   Medication Sig Dispense Refill    sertraline (ZOLOFT) 100 MG tablet Take 100 mg by mouth daily      ibuprofen (ADVIL/MOTRIN) 800 MG tablet Take 1 tablet (800 mg) by mouth every 6 hours 30 tablet 0    oxyCODONE (ROXICODONE) 5 MG tablet Take 1 tablet (5 mg) by mouth every 4 hours as needed for moderate to severe pain 20 tablet 0    senna-docusate (SENOKOT-S/PERICOLACE) 8.6-50 MG tablet Take 1 tablet by mouth 2 times daily 30 tablet 0      Allergies   Allergen Reactions    No Known Allergies       ROS:   Gen- no fevers/chills   Rheum - no morning stiffness   Derm - no rash/ redness   Neuro - no numbness, no tingling   Remainder of ROS negative.     Exam:   LMP 2024 (Approximate)      [place] [facility] [place] personally reviewed with patient at time of visit     My impression: acute nondisplaced avulsion fracture at the volar base of the second middle phalanx       Again, thank you for allowing me to participate in the care of your patient.      Sincerely,    Leny Dick MD

## 2024-03-07 DIAGNOSIS — S62.650A NONDISPLACED FRACTURE OF MIDDLE PHALANX OF RIGHT INDEX FINGER, INITIAL ENCOUNTER FOR CLOSED FRACTURE: Primary | ICD-10-CM

## 2024-03-29 ENCOUNTER — APPOINTMENT (OUTPATIENT)
Dept: RADIOLOGY | Facility: CLINIC | Age: 39
End: 2024-03-29
Attending: EMERGENCY MEDICINE
Payer: COMMERCIAL

## 2024-03-29 ENCOUNTER — HOSPITAL ENCOUNTER (EMERGENCY)
Facility: CLINIC | Age: 39
Discharge: HOME | End: 2024-03-29
Admitting: EMERGENCY MEDICINE
Payer: COMMERCIAL

## 2024-03-29 VITALS
BODY MASS INDEX: 30.53 KG/M2 | WEIGHT: 190 LBS | OXYGEN SATURATION: 100 % | DIASTOLIC BLOOD PRESSURE: 68 MMHG | HEIGHT: 66 IN | TEMPERATURE: 98.3 F | SYSTOLIC BLOOD PRESSURE: 120 MMHG | RESPIRATION RATE: 16 BRPM | HEART RATE: 82 BPM

## 2024-03-29 DIAGNOSIS — S40.022A ARM CONTUSION, LEFT, INITIAL ENCOUNTER: ICD-10-CM

## 2024-03-29 PROCEDURE — 73130 X-RAY EXAM OF HAND: CPT | Mod: LT

## 2024-03-29 PROCEDURE — 99283 EMERGENCY DEPT VISIT LOW MDM: CPT

## 2024-03-29 PROCEDURE — 73090 X-RAY EXAM OF FOREARM: CPT | Mod: LT

## 2024-03-29 ASSESSMENT — ENCOUNTER SYMPTOMS
ABDOMINAL PAIN: 0
ARTHRALGIAS: 1
HEADACHES: 0
BACK PAIN: 0
NAUSEA: 0
WEAKNESS: 0
SHORTNESS OF BREATH: 0
VOMITING: 0
NUMBNESS: 0
NECK PAIN: 0

## 2024-03-29 ASSESSMENT — COLUMBIA-SUICIDE SEVERITY RATING SCALE - C-SSRS
1. IN THE PAST MONTH, HAVE YOU WISHED YOU WERE DEAD OR WISHED YOU COULD GO TO SLEEP AND NOT WAKE UP?: NO
6. HAVE YOU EVER DONE ANYTHING, STARTED TO DO ANYTHING, OR PREPARED TO DO ANYTHING TO END YOUR LIFE?: NO
2. HAVE YOU ACTUALLY HAD ANY THOUGHTS OF KILLING YOURSELF IN THE PAST MONTH?: NO

## 2024-03-29 ASSESSMENT — ACTIVITIES OF DAILY LIVING (ADL): ADLS_ACUITY_SCORE: 33

## 2024-03-29 NOTE — DISCHARGE INSTRUCTIONS
You were seen in the ER today for evaluation of arm/hand pain.  Your x-rays do not show any evidence of fracture ordislocation.  This is likely a sprain/contusion that will heal in the next 1 to 2 weeks.  Treat your pain using the rice method (rest, ice, compression, elevation).    You may take Tylenol and ibuprofen for pain, do not exceed 4000 mg of Tylenol per day or 3200 mg of ibuprofen per day.  Apply ice to the painful areas for 20 minutes/h.  Compress using an Ace wrap during the day, take off at night to let your skin breathe.    Follow-up withyour primary care provider if your symptoms are not improving in 1 to 2 weeks.  Return to the ER if you develop any new or worsening symptoms like severe pain, fever, excessive swelling or discoloration, loss of sensation orfunction in your hand, or any other symptoms that concern you.

## 2024-03-29 NOTE — ED TRIAGE NOTES
Going down steps and fell landing on buttocks. Hit left mid forearm on railing.  Hand has been painful since and unable to move wrist without pain in hand. States 4th and 5 th finger has been feeling colder. No noticeable temperature noted by this writer. Good cap refill     Triage Assessment (Adult)       Row Name 03/29/24 1247          Triage Assessment    Airway WDL WDL        Respiratory WDL    Respiratory WDL WDL        Peripheral/Neurovascular WDL    Peripheral Neurovascular WDL WDL
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
## 2024-06-09 ENCOUNTER — OFFICE VISIT (OUTPATIENT)
Dept: FAMILY MEDICINE | Facility: CLINIC | Age: 39
End: 2024-06-09
Payer: COMMERCIAL

## 2024-06-09 VITALS
DIASTOLIC BLOOD PRESSURE: 72 MMHG | RESPIRATION RATE: 20 BRPM | HEIGHT: 66 IN | WEIGHT: 190 LBS | OXYGEN SATURATION: 99 % | SYSTOLIC BLOOD PRESSURE: 107 MMHG | TEMPERATURE: 97.8 F | BODY MASS INDEX: 30.53 KG/M2 | HEART RATE: 70 BPM

## 2024-06-09 DIAGNOSIS — M77.8 TENDONITIS OF WRIST, RIGHT: Primary | ICD-10-CM

## 2024-06-09 PROCEDURE — 99203 OFFICE O/P NEW LOW 30 MIN: CPT | Performed by: STUDENT IN AN ORGANIZED HEALTH CARE EDUCATION/TRAINING PROGRAM

## 2024-06-09 ASSESSMENT — PAIN SCALES - GENERAL: PAINLEVEL: EXTREME PAIN (9)

## 2024-06-09 NOTE — PROGRESS NOTES
"Assessment & Plan     Tendonitis of wrist, right  Patient had fractured right index finger that has healed pretty well, still a little stiffness in the finger but overall feeling better. She has had pain in the dorsal wrist with flexion and extension for the past couple days, no injuries. I suspect favoring the index finger may have made the extensor tendons in the hand and wrist more vulnerable to inflammation and strain. Advised treatment with wrist brace, icing, ibuprofen bid x 7 days and follow up if symptoms persist or worsen.   - Wrist/Arm/Hand Bracking Supplies Order Wrist Brace; Right; non-thumb spica         No follow-ups on file.    Sudha Maradiaga, TREVON CNP  [facility] SIA Umanzor is a 39 year old female who presents to clinic today for the following health issues:  Chief Complaint   Patient presents with    Wrist Pain     Right wrist; pt states that she broke her right pointer finger a while ago and is wondering if there's a correlation      HPI      Review of Systems  Constitutional, HEENT, cardiovascular, pulmonary, gi and gu systems are negative, except as otherwise noted.      Objective    /72 (BP Location: Left arm, Patient Position: Sitting, Cuff Size: Adult Large)   Pulse 70   Temp 97.8  F (36.6  C) (Oral)   Resp 20   Ht 1.676 m (5' 6\")   Wt 86.2 kg (190 lb)   SpO2 99%   BMI 30.67 kg/m    Physical Exam   GENERAL: alert and no distress  MS: focal tenderness to palpation of dorsal right wrist at 2nd extensor tendon, no overlying edema, erythema or ecchymosis  SKIN: no suspicious lesions or rashes  NEURO: Normal strength and tone, mentation intact and speech normal          "

## 2024-06-09 NOTE — PATIENT INSTRUCTIONS
Ice the wrist for 20 minutes 2-3 times a day if you are able.    Take ibuprofen twice daily 600-800 mg for 7 days to treat inflammation    Wear wrist brace until you are no longer having pain, it can take 2-4 weeks for tendonitis to resolve    Avoid strenuous use of there right hand and wrist - picking up heavy items or repetitive motions of the hand and wrist until it is healed. Okay to do light typing for work    Follow up if symptoms persist or worsen.      Afsaneh Maradiaga, CNP

## 2024-06-22 ENCOUNTER — HEALTH MAINTENANCE LETTER (OUTPATIENT)
Age: 39
End: 2024-06-22
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
## 2025-07-12 ENCOUNTER — HEALTH MAINTENANCE LETTER (OUTPATIENT)
Age: 40
End: 2025-07-12

## (undated) DEVICE — DRESSING MEPILEX BORDER POST-OP 4X10

## (undated) DEVICE — DRAPE IOBAN 2 C-SECTION 3M 6697

## (undated) DEVICE — ESU LIGASURE ATLAS 20CM  LS1020

## (undated) DEVICE — PLATE GROUNDING ADULT W/CORD 9165L

## (undated) DEVICE — SUTURE VICRYL+ 0 36IN CT-1 UND VCP946H

## (undated) DEVICE — DRESSING MEPILEX BORDER POST-OP 4X8

## (undated) DEVICE — SUCTION TIP YANKAUER W/O VENT K86

## (undated) DEVICE — LINER PRINTED RED HAZARD 24X24 A4824PRRO

## (undated) DEVICE — PACK MINOR SINGLE BASIN SSK3001

## (undated) DEVICE — PAD INSERT MED PEACH 14X6.5 62550

## (undated) DEVICE — SOL NACL 0.9% IRRIG 1000ML BOTTLE 2F7124

## (undated) DEVICE — CUSTOM PACK C-SECTION LHE

## (undated) DEVICE — SU PLAIN 3-0 XLH  27" 52T

## (undated) DEVICE — PACK MAJOR BASIN 673

## (undated) DEVICE — ESU LIGASURE OPEN SEALER/DIVIDER SM JAW 16.5MM LF1212A

## (undated) DEVICE — MITT PRE-OP 7 L X 5 1/2 W 5177M1

## (undated) DEVICE — SUTURE MONOCRYL+ 3-0 27 UND MCP523H

## (undated) DEVICE — PREP CHLORAPREP 26ML TINTED HI-LITE ORANGE 930815

## (undated) DEVICE — SUCTION MANIFOLD NEPTUNE 2 SYS 1 PORT 702-025-000

## (undated) DEVICE — SOL WATER IRRIG 1000ML BOTTLE 2F7114

## (undated) DEVICE — GLOVE BIOGEL PI ULTRATOUCH G SZ 7.0 42170

## (undated) DEVICE — SURGICEL POWDER ABSORBABLE HEMOSTAT 3GM 3013SP

## (undated) DEVICE — SOL ADH LIQUID BENZOIN SWAB 0.6ML C1544

## (undated) DEVICE — ADH SKIN CLOSURE PREMIERPRO EXOFIN 1.0ML 3470

## (undated) DEVICE — GLOVE BIOGEL PI ULTRATOUCH G SZ 6.5 42165

## (undated) DEVICE — UNDERPAD 30X30 BULK 949B10

## (undated) DEVICE — DRSG STERI STRIP 1/2X4" R1547

## (undated) DEVICE — SUTURE MONOCRYL+ 0 CT-1 UND 18 MCP946H

## (undated) RX ORDER — EPHEDRINE SULFATE 50 MG/ML
INJECTION, SOLUTION INTRAMUSCULAR; INTRAVENOUS; SUBCUTANEOUS
Status: DISPENSED
Start: 2022-06-07

## (undated) RX ORDER — FENTANYL CITRATE 50 UG/ML
INJECTION, SOLUTION INTRAMUSCULAR; INTRAVENOUS
Status: DISPENSED
Start: 2022-06-07

## (undated) RX ORDER — DEXAMETHASONE SODIUM PHOSPHATE 10 MG/ML
INJECTION, SOLUTION INTRAMUSCULAR; INTRAVENOUS
Status: DISPENSED
Start: 2022-06-07

## (undated) RX ORDER — FENTANYL CITRATE-0.9 % NACL/PF 10 MCG/ML
PLASTIC BAG, INJECTION (ML) INTRAVENOUS
Status: DISPENSED
Start: 2022-06-07

## (undated) RX ORDER — MORPHINE SULFATE 1 MG/ML
INJECTION, SOLUTION EPIDURAL; INTRATHECAL; INTRAVENOUS
Status: DISPENSED
Start: 2022-06-07

## (undated) RX ORDER — ONDANSETRON 2 MG/ML
INJECTION INTRAMUSCULAR; INTRAVENOUS
Status: DISPENSED
Start: 2022-06-07

## (undated) RX ORDER — KETOROLAC TROMETHAMINE 30 MG/ML
INJECTION, SOLUTION INTRAMUSCULAR; INTRAVENOUS
Status: DISPENSED
Start: 2022-06-07

## (undated) RX ORDER — OXYTOCIN/0.9 % SODIUM CHLORIDE 30/500 ML
PLASTIC BAG, INJECTION (ML) INTRAVENOUS
Status: DISPENSED
Start: 2022-06-07